# Patient Record
Sex: MALE | Race: WHITE
[De-identification: names, ages, dates, MRNs, and addresses within clinical notes are randomized per-mention and may not be internally consistent; named-entity substitution may affect disease eponyms.]

---

## 2017-06-01 ENCOUNTER — HOSPITAL ENCOUNTER (OUTPATIENT)
Dept: HOSPITAL 53 - M SFHCLACO | Age: 47
End: 2017-06-01
Attending: PHYSICIAN ASSISTANT
Payer: COMMERCIAL

## 2017-06-01 DIAGNOSIS — E78.2: ICD-10-CM

## 2017-06-01 DIAGNOSIS — I10: Primary | ICD-10-CM

## 2017-06-01 LAB
ALBUMIN SERPL BCG-MCNC: 4.4 GM/DL (ref 3.2–5.2)
ALBUMIN/GLOB SERPL: 1.38 {RATIO} (ref 1–1.93)
ALP SERPL-CCNC: 74 U/L (ref 45–117)
ALT SERPL W P-5'-P-CCNC: 38 U/L (ref 12–78)
ANION GAP SERPL CALC-SCNC: 9 MEQ/L (ref 8–16)
AST SERPL-CCNC: 21 U/L (ref 15–37)
BILIRUB SERPL-MCNC: 0.5 MG/DL (ref 0.2–1)
BUN SERPL-MCNC: 16 MG/DL (ref 7–18)
CALCIUM SERPL-MCNC: 9.4 MG/DL (ref 8.5–10.1)
CHLORIDE SERPL-SCNC: 104 MEQ/L (ref 98–107)
CHOLEST SERPL-MCNC: 320 MG/DL (ref ?–200)
CO2 SERPL-SCNC: 25 MEQ/L (ref 21–32)
CREAT SERPL-MCNC: 0.94 MG/DL (ref 0.7–1.3)
GFR SERPL CREATININE-BSD FRML MDRD: > 60 ML/MIN/{1.73_M2} (ref 60–?)
GLUCOSE SERPL-MCNC: 108 MG/DL (ref 70–105)
POTASSIUM SERPL-SCNC: 4.4 MEQ/L (ref 3.5–5.1)
PROT SERPL-MCNC: 7.6 GM/DL (ref 6.4–8.2)
SODIUM SERPL-SCNC: 138 MEQ/L (ref 136–145)
TRIGL SERPL-MCNC: 302 MG/DL (ref ?–150)

## 2019-06-17 ENCOUNTER — HOSPITAL ENCOUNTER (OUTPATIENT)
Dept: HOSPITAL 53 - M PAIN | Age: 49
End: 2019-06-17
Attending: ANESTHESIOLOGY
Payer: COMMERCIAL

## 2019-06-17 DIAGNOSIS — Z88.0: ICD-10-CM

## 2019-06-17 DIAGNOSIS — M54.5: ICD-10-CM

## 2019-06-17 DIAGNOSIS — G89.29: Primary | ICD-10-CM

## 2019-06-17 DIAGNOSIS — G47.00: ICD-10-CM

## 2019-06-17 DIAGNOSIS — M79.671: ICD-10-CM

## 2019-06-17 DIAGNOSIS — G25.0: ICD-10-CM

## 2019-06-17 DIAGNOSIS — M79.672: ICD-10-CM

## 2019-06-17 DIAGNOSIS — F32.9: ICD-10-CM

## 2019-06-17 DIAGNOSIS — Z88.1: ICD-10-CM

## 2019-06-17 DIAGNOSIS — M25.511: ICD-10-CM

## 2019-06-17 DIAGNOSIS — Z88.8: ICD-10-CM

## 2019-06-17 DIAGNOSIS — M54.6: ICD-10-CM

## 2019-06-17 DIAGNOSIS — M79.18: ICD-10-CM

## 2019-06-17 DIAGNOSIS — Z79.899: ICD-10-CM

## 2019-06-17 DIAGNOSIS — I10: ICD-10-CM

## 2019-06-17 DIAGNOSIS — G43.909: ICD-10-CM

## 2019-06-17 DIAGNOSIS — E78.5: ICD-10-CM

## 2019-06-17 DIAGNOSIS — Z79.891: ICD-10-CM

## 2019-06-30 NOTE — ECWPNPC
PATIENT NAME: AYLEEN HERNANDEZ

: 1970

GENDER: MALE

MRN: [id number]

VISIT DATE: 2019

DISCHARGE DATE: 19 1118

VISIT LOCKED DATE TIME: 

PHYSICIAN: GABBY DURAN MD

RESOURCE: GABBY DURAN MD

 

           

           

REASON FOR APPOINTMENT

           

          1. LBP, NO UPDATED IMAGING

           

HISTORY OF PRESENT ILLNESS

           

      PAIN SCREENING:

      PATIENT HAS A COMPLAINT OF ACUTE OR CHRONIC PAIN

           

           

          :YES

           

           49 YEAR OLD MALE PATIENT WITH A HISTORY OF CHRONIC LOW BACK AND

          THORACIC PAIN. THE PATIENT DESCRIBES THE PAIN AS ACHING,

          SHOOTING, SHARP, SORE, TENDER, AND DAILY WITH A PAIN SCORE OF

          3-7/10 DEPENDING ON PHYSICAL ACTIVITY. THE PATIENT STATES THE

          PAIN STARTS IN HIS THORACIC, LOW BACK, AND BOTH LEGS, BUT HIS

          LEFT LEG IS AFFECTED MORE THAN HIS RIGHT. THE PATIENT SAYS HE

          EXPERIENCES TINGLING IN HIS FEET AND HEELS WHILE RESTING AND

          SHARP, STABBING PAIN WHILE WALKING. THE PATIENT SAYS THE BACK AND

          LEG PAIN BEGINS IN THE MORNING AND LASTS THROUGH THE AFTERNOON

          WHILE HE IS AT WORK AND THE PAIN INCREASES WITH PHYSICAL

          ACTIVITIES. THE PATIENT STATES HE HAS BEEN SUFFERING FROM THIS

          PAIN FOR ABOUT 2 YEARS. THE PATIENT SAYS THE PAIN IS AFFECTING

          HIS ABILITY TO PERFORM HIS DAILY ACTIVITIES AT WORK AND AT HOME.

          THE PATIENT IS CURRENTLY USING NORCO 5-325 MG AND CYCLOBENZAPRINE

          10 MG AS PAIN RELIEVERS. PATIENT DENIES UNEXPLAINABLE WEIGHT

          LOSS, FEVER, CHILLS, NEW CHANGES ON HIS URINARY OR BOWEL CONTROL.

           

      FALL RISK SCREENING:

      SCREENING

           

           

          :NO FALLS REPORTED IN THE LAST YEAR

           

CURRENT MEDICATIONS

           

          TAKING LISINOPRIL 40 MG TABLET 1 TABLET ORALLY ONCE A DAY

          TAKING ZOMIG 5 MG TABLET 1 TABLET AS NEEDED ONE TIME ORALLY ONCE

          A DAY

          TAKING LIDOCAINE 5 % OINTMENT APPLY TO LOW BACK EXTERNALLY THREE

          TIMES A DAY

          TAKING CYCLOBENZAPRINE HCL 10 MG TABLET 1 TABLET ORALLY THREE

          TIMES A DAY AS NEEDED FOR BACK PAIN

          TAKING NORCO 5-325 MG TABLET 1 TABLET ORALLY THREE TIMES A DAY AS

          NEEDED, MDD=3

          TAKING SIMVASTATIN 10 MG TABLET 1 TABLET IN THE EVENING ORALLY

          ONCE A DAY

          TAKING ZOLPIDEM TARTRATE 10 MG TABLET 1 TABLET ORALLY ONCE A DAY

          AT BEDTIME

          NOT-TAKING PROZAC 20 MG CAPSULE 1 CAPSULE ORALLY ONCE A DAY

          MEDICATION LIST REVIEWED AND RECONCILED WITH THE PATIENT

           

PAST MEDICAL HISTORY

           

          RECURRENT LOW BACK PAIN (MRI NEGATIVE FOR DISC DISEASE)

          DEPRESSION

          INSOMNIA

          MIGRAINES

          RIGHT SHOULDER PAIN

          HYPERLIPIDEMIA

          ESSENTIAL TREMORS

          PAIN RIGHT ELBOW

          ESSENTIAL HYPERTENSION

           

ALLERGIES

           

          PENICILLIN V-POTASSIUM: HIVES - ALLERGY

          LEVAQUIN: MUSCLE PAIN - SIDE EFFECTS

          PROZAC: MUSCLE AND JOINT PAIN - SIDE EFFECTS

           

SURGICAL HISTORY

           

          LAPAROSCOPIC APPENDECTOMY 

          CHOLECYSTECTOMY 

          BILATERAL CARPAL REPAIR WITH ULNAR NERVE REPOSITIONING 

          RIGHT SHOULDER DECOMPRESSION 2016

           

FAMILY HISTORY

           

          FATHER:  34 YRS, LIVER CANCER

          MOTHER: ALIVE, HYPERTENSION

          2DAUGHTER(S) - HEALTHY.

           

SOCIAL HISTORY

           

          GENERAL:

           

          TOBACCO USE

          ARE YOU A:NONSMOKER , NEVER SMOKER

          ADDITIONAL FINDINGS: TOBACCO USERCHEWS LOOSE LEAF TOBACCO

          COUNCELED ON THE IMPORTANCE OF NOT CHEWING TOBACCO

           

           

          HIV / HEP-C SCREENING

          HIV TEST OFFERED TO PATIENT:YES

          DATE OFFERED:2017

          TEST ACCEPTED:NO

          HEP-C TEST OFFERED TO PATIENT:YES

          DATE OFFERED:2017

          REASON:PATIENT DECLINED

          TEST ACCEPTED:NO

          REASON:PATIENT DECLINED

           

           

          OTHERS AT HOME: WIFE, CHILDREN.

           

           

          EDUCATION

          LEVEL OF EDUCATION:FINISHED [employer]

           

           

          DIET: LOW FAT, LOW CHOLESTEROL.

           

           

          LANGUAGE

          LANGUAGES SPOKEN:ENGLISH

           

           

          DOMESTIC VIOLENCE

          DO YOU FEEL SAFE IN YOUR ENVIRONMENT?YES

           

           

          BMI CARE GOAL FOLLOW-UP

          ABOVE NORMAL BMI FOLLOW-UPDIETARY MANAGEMENT EDUCATION, GUIDANCE,

          AND COUNSELING

           

           

          RECREATIONAL DRUG USE  DENIES.

           

           

          EXERCISE: NO REGULAR EXERCISE.

           

           

          LEARNING BARRIERS / SPECIAL NEEDS

          CHANGE FROM LAST VISIT?NO

          BARRIERS TO LEARNING?NO

          HEARING IMPAIRED?NO

          VISION IMPAIRED?YES

          :CORRECTIVE LENSES

          COGNITIVELY IMPAIRED?NO

          READINESS TO LEARN?YES

          LEARNING PREFERENCES?NO

          LEARNING CAPABILITIES PRESENT?YES

          EMOTIONAL BARRIERS?NO

          SPECIAL DEVICES?NO

           NEEDED?NO

           

           

          LUNG CANCER SCREENING

          SMOKING STATUS:NON SMOKER

           

           

          PAIN CLINIC PFS, CLERGY, PUBLIC HEALTH REFERRALS

          HAS THE PATIENT BEEN EDUCATED REGARDING HIS/HER PLAN OF CARE?YES

          HAS THE PATIENT BEEN EDUCATED REGARDING PAIN, THE RISK FOR PAIN,

          THE IMPORTANCE OF EFFECTIVE PAIN MANAGEMENT, AND THE PAIN

          ASSESSMENT PROCESS?YES

           

           

          LATEX QUESTIONNAIRE

          LATEX ALLERGY : HAVE YOU EVER DEVELOPED ANY TYPE OF REACTION

          AFTER HANDLING LATEX PRODUCTS SUCH AS RUBBER GLOVES, CONDOMS,

          DIAPHRAGMS, BALLOONS, SOCKS, OR UNDERWEAR?NO

          LATEX ALLERGY : HAVE YOU EVER DEVELOPED ANY TYPE OF REACTION

          DURING OR AFTER DENTAL APPOINTMENT, VAGINAL/RECTAL EXAMINATION,

          SURGICAL PROCEDURE, OR ANY OTHER EXPOSURE?NO

          LATEX RISK : HAVE YOU EVER HAD ANY DIFFICULTY BREATHING OR HIVES

          AFTER EATING OR HANDLING ANY FRUITS, OR VEGETABLES; SUCH AS KIWI,

          BANANAS, STONE FRUITS, OR CHESTNUTSNO

          LATEX RISK : DO YOU HAVE A PREVIOUS PERSONAL HISTORY OF MORE THAN

          NINE SURGERIES, SPINA BIFIDA, OR REPEATED CATHERTIZATIONS? NO

          LATEX RISK : ARE YOU FREQUENTLY EXPOSED TO LATEX PRODUCTS IN YOUR

          OCCUPATION?NO

          DATE ASKED : 2019

           

           

          CAFFEINE

          CAFFEINE USE?YES

          HOW OFTEN AND HOW MUCH? 2-3 COFFEE/DAY

           

           

          ADVANCE DIRECTIVE

          ADVANCE DIRECTIVE DISCUSSED WITH PATIENT:YES 19 PT DOES NOT

          HAVE ANY ADVANCE DIRECTIVES AND HE DECLINES INFORMATION ON HCP AT

          THIS TIME

           

           

          [employer]

          [id number]

           

           

          MARITAL STATUS: , 2 CHILDREN.

           

           

          ALCOHOL SCREENING

          DID YOU HAVE A DRINK CONTAINING ALCOHOL IN THE PAST YEAR?YES

          HOW OFTEN DID YOU HAVE SIX OR MORE DRINKS ON ONE OCCASION IN THE

          PAST YEAR?NEVER (0 POINTS)

          HOW MANY DRINKS DID YOU HAVE ON A TYPICAL DAY WHEN YOU WERE

          DRINKING IN THE PAST YEAR?1 OR 2 (0 POINTS)

          HOW OFTEN DID YOU HAVE A DRINK CONTAINING ALCOHOL IN THE PAST

          YEAR?TWO TO THREE TIMES PER WEEK (3 POINTS)

          POINTS3

          INTERPRETATIONNEGATIVE

           

           

          OCCUPATION: .

           

           

          SEXUAL HX

          HAD SEX IN THE LAST 12 MONTHS (VAGINAL, ORAL, OR ANAL)?YES

          WITHWOMEN ONLY

           

           

          ,.

           

HOSPITALIZATION/MAJOR DIAGNOSTIC PROCEDURE

           

          SURGERIES AS ABOVE

           

REVIEW OF SYSTEMS

           

      REVIEWED BY:

           

          PROVIDER:    GABBY DURAN MD .

           

      CONSTITUTIONAL:

           

          ANY CHANGE IN YOUR MEDICAL CONDITION?    NO . CHILLS    NO .

          FEVER    NO .

           

      INFECTION:

           

          DO YOU HAVE NEW INFECTIONS?    NO . DO YOU HAVE HISTORY OF MRSA? 

            NO .

           

      MUSCULOSKELETAL:

           

          ANY NEW PATTERNS OF PAIN OR NUMBNESS?    YES, WITHIN THE LAST FEW

          WEEKS HAS BEEN HAVING NECK AND SHOULDER PAIN AND HE HAS BEEN

          WAKING UP WITH LEFT HAND BEING NUMB . SYTEMIC LUPUS    NO .

           

      GASTROENTEROLOGY:

           

          ANY NEW CHANGE IN BOWEL CONTROL?    NO . BARRETTS ESOPHAGUS    NO

          . CIRRHOSIS    NO . HEPATITIS    NO . LIVER FAILURE    NO . ACID

          REFLUX    NO . UNEXPLAINED WEIGHT LOSS    NO .

           

      GENITOURINARY:

           

          ANY NEW CHANGE IN BLADDER CONTROL?    NO . IS THERE A CHANCE YOU

          COULD BE PREGNANT?    NO .

           

      HEMATOLOGY/LYMPH:

           

          DO YOU TAKE ANY BLOOD THINNERS? (FOR EXAMPLE- COUMADIN, PLAVIX,

          AGGRENOX, PLATEL, PRADAXA, OR XARELTO)    NO . WHEN WAS YOUR LAST

          DOSE?    DATE: TIME:  . LOW PLATELET COUNT    NO . SICKLE CELL

          DISEASE    NO . VON WILLIEBRANDS    NO . FACTOR V LEIDEN    NO .

          THALLASEMIA    NO . ANEMIA    NO . EASY BRUISING    NO .

           

      NEUROLOGY:

           

          HAVE YOU FALLEN IN THE PAST 12 MONTHS?    NO . ANY NEW EXTREMITY

          NUMBNESS OR WEAKNESS?    YES, LEFT ARM NUMB AT TIMES WHEN HE

          WAKES UP . HEAD INJURY    NO . DEMENTIA    NO . CEREBRAL PALSY   

          NO . MULTIPLE SCLEROSIS    NO . DIZZINESS    NO . HEADACHE   

          ADMITS TO MIGRAINES APPROX. ONCE A MONTH . STROKES    NO .

          VERTIGO    NO .

           

      CARDIOLOGY:

           

          DO YOU HAVE A PACEMAKER OR DEFIBRILLATOR?    NO . ANGINA    NO .

          HEART ATTACK    NO . HEART SURGERY    NO . CONGESTIVE HEART

          FAILURE/FLUID OVERLOAD    NO . CHEST PAIN    NO . HIGH BLOOD

          PRESSURE    ON MEDICATION(S) . IRREGULAR HEART BEAT    NO .

           

      RESPIRATORY:

           

          HAVE YOU BEEN SICK IN THE PAST WEEK?    NO . FEVER    NO . FLU

          LIKE SYMPTOMS?    NO . CPAP    HAS MILD WILLI AND HAS A CPAP BUT

          DOESN'T USE . BYPAP    NO . ASTHMA    NO . EMPHYSEMA    NO .

          CHRONIC LUNG DISEASES    NO . SHORTNESS OF BREATH ON EXERTION   

          NO . COUGH    NO . SNORING    NO .

           

      INTEGUMENTARY:

           

          DO YOU HAVE ANY RASHES OR OPEN SORES?    NO .

           

      ALLERGIC/IMMUNO:

           

          ARE YOU ALLERGIC TO IV DYE?    NO . ANY NEW ALLERGIES?    NO .

           

      PSYCHIATRIC:

           

          DO YOU HAVE THOUGHTS OF HURTING YOURSELF OR SOMEONE ELSE?    NO .

          ARE YOU ABUSED, NEGLECTED, OR IN AN UNSAFE ENVIRONMENT?    NO .

           

      ENDOCRINOLOGY:

           

          ARE YOU DIABETIC?    NO . THYROID DISORDER    NO .

           

      OTHER:

           

          DO YOU NEED ANY PRESCRIPTIONS?    YES, NO . IF YES, PLEASE LIST: 

            NORCO, FLEXERIL . ANY NEW PROBLEMS WITH YOUR MEDICATIONS?    NO

          . WHEN DID YOU LAST EAT?    ____ . WHEN DID YOU LAST DRINK?   

          ____ . WHAT DID YOU LAST DRINK?    ____ . NAME OF PERSON DRIVING

          YOU HOME?    ____ . DO YOU HAVE ANY OTHER QUESTIONS OR CONCERNS  

           NO .

           

VITAL SIGNS

           

          .4 LBS, HT 69.5 IN, BMI 30.04 INDEX, /78 MM HG, HR 98

          /MIN, RR 18 /MIN, TEMP 99.1 F, OXYGEN SAT % 98%, SAFE IN ENV?

          (Y/N) Y, NA INITIALS AW 0919, REVIEWED BY: LINDA.

           

EXAMINATION

           

      GENERAL EXAMINATION: PATIENT IS ALERT O X 3 AND

          COOPERATIVE. LUNGS CLEAR, TO AUSCULTATION. HEART: NO MURMURS OR

          GALLOPS; FACIAL CRANIAL NERVES ARE GROSSLY NORMAL. GOOD SYMMETRY

          OF FACIAL MUSCLE MOVEMENT. NORMAL VISUAL FIELDS. TENDERNESS IN

          THE THORACIC AND LUMBAR SPINE NEAR THE FACET JOINTS. PRESENCE OF

          BANDS OF TISSUE AND TRIGGER POINTS WITH RESTRICTION OF MOVEMENT

          OF THE THORACIC AND LUMBAR SPINE. TENDERNESS IN THE LOW BACK OVER

          THE SACROILIAC JOINT. FABERE TEST IS POSITIVE FOR BILATERAL

          SACROILIAC JOINT DYSFUNCTION. STRAIGHT LEG RAISE OF BOTH LEGS DO

          NOT INDUCE PAIN. LEFT LEG IS WEAKER AT EXTENSION AND FLEXION. MRI

          OF THE LUMBAR SPINE DONE ON 10/05/2012 SHOWS BULGING DISC AT

          L5-S1 LEVEL.

           

ASSESSMENTS

           

          MYALGIA, OTHER SITE - M79.18 (PRIMARY)

           

          LOW BACK PAIN - M54.5

           

          OTHER CHRONIC PAIN - G89.29

           

          PAIN IN THORACIC SPINE - M54.6

           

          PAIN IN RIGHT FOOT - M79.671

           

          PAIN OF LEFT FOOT - M79.672

           

          RULE OUT SACROILIAC JOINT DYSFUNCTION.

           

TREATMENT

           

      MYALGIA, OTHER SITE

          CLINICAL NOTES: WE DISCUSSED SEVERAL ISSUES WITH MR. HERNANDEZ'S

          PAIN MANAGEMENT CASE. I DISCUSSED WITH THE PATIENT ABOUT THE

          OPTIONS OF TRIGGER POINT INJECTIONS OR A SACROILIAC JOINT BLOCK

          ON THE LEFT SIDE. DUE TO THE TRIGGER POINTS, BANDS OF TISSUE, AND

          RESTRICTION OF MOVEMENT, I WOULD LIKE TO MOVE FORWARD WITH

          THORACIC AND LOW BACK TRIGGER POINT INJECTIONS AT THIS TIME. WE

          DISCUSSED THE BENEFITS, RISKS, AND ALTERNATIVES OF THE INJECTION

          AND THE PATIENT WOULD LIKE TO PROCEED. I WILL REFER THE PATIENT

          TO A PODIATRIST DUE TO HEEL PAIN. THE PATIENT WILL FOLLOW UP IN

          SEVERAL WEEKS AFTER THE INJECTION. INSTRUCTIONS WERE GIVEN,

          QUESTIONS WERE ANSWERED, PATIENT REPORTS UNDERSTANDING AND AGREES

          WITH THE PLAN. I, SABINO PEREZ, DOCUMENTED THE ABOVE

          INFORMATION ACTING AS A SCRIBE FOR DR. DURAN. I HAVE REVIEWED

          THE ABOVE DOCUMENT, WRITTEN BY SABINO NOLAN AND I VERIFY

          THAT IT IS ACCURATE.

           

          DEAR JOYCE TAYLOR PA-C:

           

          THANK YOU FOR YOUR KIND REFERRAL OF AYLEEN HERNANDEZ. IF YOU WANT TO

          DISCUSS HIS CASE WITH ME PLEASE CALL ME AT THE PAIN CENTER AT

          [phone].

           

          SINCERELY,

           

          GABBY DURAN MD

           

          PAIN MEDICINE

           

          .

           

PREVENTIVE MEDICINE

           

      PAIN CLINIC TEACHING:

           

          PROCEDURE TEACHING   PT GIVEN WRITTEN AND VERBAL EDUCATION ON

          TRIGGER POINT INJECTIONS. PT ALSO GIVEN WRITTEN AND VERBAL

          PRE-PROCEDURE INSTRUCTIONS. PT VERBALIZES UNDERSTANDING OF ALL

          EDUCATION AND INSTRUCTIONS. SAPNA VALENTINO 2019 11:18:20

          AM > .

           

PROCEDURE CODES

           

           ESTABILISHED PATIENT [facility] FACILITY CHARGE

           

           CURRENT MEDS W/DOSAGES DOCUMENTED

           

           PAIN ASSESS POS TOOL F/U PLAN DOC

           

DISPOSITION & COMMUNICATION

           

FOLLOW UP

           

          3 WEEKS (REASON: TPI)

           

 

ELECTRONICALLY SIGNED BY GABBY DURAN MD, MD ON

          2019 AT 07:04 PM EDT

           

           

           

 

DISCLAIMER :

THIS IS A VISIT SUMMARY EXTRACTED FROM THE [employer] CHART.

IT IS NOT A COPY OF THE [employer] PROGRESS NOTE.

VIOLETTE

## 2019-08-05 ENCOUNTER — HOSPITAL ENCOUNTER (OUTPATIENT)
Dept: HOSPITAL 53 - M PAIN | Age: 49
End: 2019-08-05
Attending: ANESTHESIOLOGY
Payer: COMMERCIAL

## 2019-08-05 DIAGNOSIS — F32.9: ICD-10-CM

## 2019-08-05 DIAGNOSIS — M25.521: ICD-10-CM

## 2019-08-05 DIAGNOSIS — I10: ICD-10-CM

## 2019-08-05 DIAGNOSIS — G43.909: ICD-10-CM

## 2019-08-05 DIAGNOSIS — Z79.899: ICD-10-CM

## 2019-08-05 DIAGNOSIS — Z90.49: ICD-10-CM

## 2019-08-05 DIAGNOSIS — M54.5: ICD-10-CM

## 2019-08-05 DIAGNOSIS — M25.511: ICD-10-CM

## 2019-08-05 DIAGNOSIS — G47.00: ICD-10-CM

## 2019-08-05 DIAGNOSIS — M79.18: Primary | ICD-10-CM

## 2019-08-05 DIAGNOSIS — E78.5: ICD-10-CM

## 2019-08-05 DIAGNOSIS — Z79.891: ICD-10-CM

## 2019-08-05 DIAGNOSIS — G25.0: ICD-10-CM

## 2019-08-05 PROCEDURE — 20552 NJX 1/MLT TRIGGER POINT 1/2: CPT

## 2019-08-13 NOTE — ECWPNPC
PATIENT NAME: AYLEEN HERNANDEZ

: 1970

GENDER: MALE

MRN: [id number]

VISIT DATE: 2019

DISCHARGE DATE: 19

VISIT LOCKED DATE TIME: 

PHYSICIAN: GABBY DURAN MD

RESOURCE: GABBY DURAN MD

 

           

           

REASON FOR APPOINTMENT

           

          1. TPI

           

HISTORY OF PRESENT ILLNESS

           

      HISTORY OF PRESENT ILLNESS:

      PAIN

           

           

          THE PATIENT DESCRIBES THE PAIN...

           

      FALL RISK SCREENING:

      SCREENING

           

           

          :NO FALLS REPORTED IN THE LAST YEAR

           

CURRENT MEDICATIONS

           

          TAKING ZOMIG 5 MG TABLET 1 TABLET AS NEEDED ONE TIME ORALLY ONCE

          A DAY, NOTES: 1 WEEK AGO

          TAKING LIDOCAINE 5 % OINTMENT APPLY TO LOW BACK EXTERNALLY THREE

          TIMES A DAY, NOTES: 2 DAYS AGO

          TAKING CYCLOBENZAPRINE HCL 10 MG TABLET 1 TABLET ORALLY THREE

          TIMES A DAY AS NEEDED FOR BACK PAIN, NOTES: 19@0800

          TAKING SIMVASTATIN 10 MG TABLET 1 TABLET IN THE EVENING ORALLY

          ONCE A DAY, NOTES: 19@

          TAKING ZOLPIDEM TARTRATE 10 MG TABLET 1 TABLET ORALLY ONCE A DAY

          AT BEDTIME, NOTES: 

          TAKING NORCO 5-325 MG TABLET 1 TABLET ORALLY THREE TIMES A DAY AS

          NEEDED, MDD=3, NOTES: 

          TAKING LISINOPRIL 40 MG TABLET 1 TABLET ORALLY ONCE A DAY, NOTES:

          

          DISCONTINUED PROZAC 20 MG CAPSULE 1 CAPSULE ORALLY ONCE A DAY

           

PAST MEDICAL HISTORY

           

          RECURRENT LOW BACK PAIN (MRI NEGATIVE FOR DISC DISEASE)

          DEPRESSION

          INSOMNIA

          MIGRAINES

          RIGHT SHOULDER PAIN

          HYPERLIPIDEMIA

          ESSENTIAL TREMORS

          PAIN RIGHT ELBOW

          ESSENTIAL HYPERTENSION

           

ALLERGIES

           

          PENICILLIN V-POTASSIUM: HIVES - ALLERGY

          LEVAQUIN: MUSCLE PAIN - SIDE EFFECTS

          PROZAC: MUSCLE AND JOINT PAIN - SIDE EFFECTS - ONSET DATE

          2019

           

SURGICAL HISTORY

           

          LAPAROSCOPIC APPENDECTOMY 

          CHOLECYSTECTOMY 

          BILATERAL CARPAL REPAIR WITH ULNAR NERVE REPOSITIONING 

          RIGHT SHOULDER DECOMPRESSION 2016

           

FAMILY HISTORY

           

          FATHER:  34 YRS, LIVER CANCER

          MOTHER: ALIVE, HYPERTENSION

          2DAUGHTER(S) - HEALTHY.

           

SOCIAL HISTORY

           

          GENERAL:

           

          TOBACCO USE

          ARE YOU A:NONSMOKER , NEVER SMOKER

          ADDITIONAL FINDINGS: TOBACCO USERCHEWS LOOSE LEAF TOBACCO

          COUNCELED ON THE IMPORTANCE OF NOT CHEWING TOBACCO

           

           

          HIV / HEP-C SCREENING

          HIV TEST OFFERED TO PATIENT:YES

          DATE OFFERED:2017

          TEST ACCEPTED:NO

          HEP-C TEST OFFERED TO PATIENT:YES

          DATE OFFERED:2017

          REASON:PATIENT DECLINED

          TEST ACCEPTED:NO

          REASON:PATIENT DECLINED

           

           

          OTHERS AT HOME: WIFE, CHILDREN.

           

           

          EDUCATION

          LEVEL OF EDUCATION:FINISHED [employer]

           

           

          DIET: LOW FAT, LOW CHOLESTEROL.

           

           

          LANGUAGE

          LANGUAGES SPOKEN:ENGLISH

           

           

          DOMESTIC VIOLENCE

          DO YOU FEEL SAFE IN YOUR ENVIRONMENT?YES

           

           

          BMI CARE GOAL FOLLOW-UP

          ABOVE NORMAL BMI FOLLOW-UPDIETARY MANAGEMENT EDUCATION, GUIDANCE,

          AND COUNSELING

           

           

          RECREATIONAL DRUG USE  DENIES.

           

           

          EXERCISE: NO REGULAR EXERCISE.

           

           

          LEARNING BARRIERS / SPECIAL NEEDS

          CHANGE FROM LAST VISIT?NO

          BARRIERS TO LEARNING?NO

          HEARING IMPAIRED?NO

          VISION IMPAIRED?YES

          COGNITIVELY IMPAIRED?NO

          :CORRECTIVE LENSES

          READINESS TO LEARN?YES

          LEARNING PREFERENCES?NO

          LEARNING CAPABILITIES PRESENT?YES

          EMOTIONAL BARRIERS?NO

          SPECIAL DEVICES?NO

           NEEDED?NO

           

           

          LUNG CANCER SCREENING

          SMOKING STATUS:NON SMOKER

           

           

          PAIN CLINIC PFS, CLERGY, PUBLIC HEALTH REFERRALS

          HAS THE PATIENT BEEN EDUCATED REGARDING HIS/HER PLAN OF CARE?YES

          HAS THE PATIENT BEEN EDUCATED REGARDING PAIN, THE RISK FOR PAIN,

          THE IMPORTANCE OF EFFECTIVE PAIN MANAGEMENT, AND THE PAIN

          ASSESSMENT PROCESS?YES

           

           

          LATEX QUESTIONNAIRE

          LATEX ALLERGY : HAVE YOU EVER DEVELOPED ANY TYPE OF REACTION

          AFTER HANDLING LATEX PRODUCTS SUCH AS RUBBER GLOVES, CONDOMS,

          DIAPHRAGMS, BALLOONS, SOCKS, OR UNDERWEAR?NO

          LATEX ALLERGY : HAVE YOU EVER DEVELOPED ANY TYPE OF REACTION

          DURING OR AFTER DENTAL APPOINTMENT, VAGINAL/RECTAL EXAMINATION,

          SURGICAL PROCEDURE, OR ANY OTHER EXPOSURE?NO

          LATEX RISK : HAVE YOU EVER HAD ANY DIFFICULTY BREATHING OR HIVES

          AFTER EATING OR HANDLING ANY FRUITS, OR VEGETABLES; SUCH AS KIWI,

          BANANAS, STONE FRUITS, OR CHESTNUTSNO

          LATEX RISK : DO YOU HAVE A PREVIOUS PERSONAL HISTORY OF MORE THAN

          NINE SURGERIES, SPINA BIFIDA, OR REPEATED CATHERIZATIONS? NO

          LATEX RISK : ARE YOU FREQUENTLY EXPOSED TO LATEX PRODUCTS IN YOUR

          OCCUPATION?NO

          DATE ASKED : 2019

           

           

          CAFFEINE

          CAFFEINE USE?YES

          HOW OFTEN AND HOW MUCH? 2-3 COFFEE/DAY

           

           

          ADVANCE DIRECTIVE

          ADVANCE DIRECTIVE DISCUSSED WITH PATIENT:YES 19 PT DOES NOT

          HAVE ANY ADVANCE DIRECTIVES AND HE DECLINES INFORMATION ON HCP AT

          THIS TIME

           

           

          Scientology

          [id number] [employer]

           

           

          MARITAL STATUS: , 2 CHILDREN.

           

           

          ALCOHOL SCREENING

          DID YOU HAVE A DRINK CONTAINING ALCOHOL IN THE PAST YEAR?YES

          HOW OFTEN DID YOU HAVE SIX OR MORE DRINKS ON ONE OCCASION IN THE

          PAST YEAR?NEVER (0 POINTS)

          HOW MANY DRINKS DID YOU HAVE ON A TYPICAL DAY WHEN YOU WERE

          DRINKING IN THE PAST YEAR?1 OR 2 (0 POINTS)

          HOW OFTEN DID YOU HAVE A DRINK CONTAINING ALCOHOL IN THE PAST

          YEAR?TWO TO THREE TIMES PER WEEK (3 POINTS)

          POINTS3

          INTERPRETATIONNEGATIVE

           

           

          OCCUPATION: .

           

           

          SEXUAL HX

          HAD SEX IN THE LAST 12 MONTHS (VAGINAL, ORAL, OR ANAL)?YES

          WITHWOMEN ONLY

           

           

          ,.

           

HOSPITALIZATION/MAJOR DIAGNOSTIC PROCEDURE

           

          SURGERIES AS ABOVE

           

REVIEW OF SYSTEMS

           

      REVIEWED BY:

           

          PROVIDER:    _____ .

           

      CONSTITUTIONAL:

           

          ANY CHANGE IN YOUR MEDICAL CONDITION?    NO . CHILLS    NO .

          FEVER    NO .

           

      INFECTION:

           

          DO YOU HAVE NEW INFECTIONS?    NO . DO YOU HAVE HISTORY OF MRSA? 

            NO .

           

      MUSCULOSKELETAL:

           

          ANY NEW PATTERNS OF PAIN OR NUMBNESS?    NO .

           

      GASTROENTEROLOGY:

           

          ANY NEW CHANGE IN BOWEL CONTROL?    NO .

           

      GENITOURINARY:

           

          ANY NEW CHANGE IN BLADDER CONTROL?    NO . IS THERE A CHANCE YOU

          COULD BE PREGNANT?    NO .

           

      HEMATOLOGY/LYMPH:

           

          DO YOU TAKE ANY BLOOD THINNERS? (FOR EXAMPLE- COUMADIN, PLAVIX,

          AGGRENOX, PLATEL, PRADAXA, OR XARELTO)    NO . WHEN WAS YOUR LAST

          DOSE?    DATE: TIME:  .

           

      NEUROLOGY:

           

          HAVE YOU FALLEN IN THE PAST 12 MONTHS?    NO . ANY NEW EXTREMITY

          NUMBNESS OR WEAKNESS?    NO .

           

      CARDIOLOGY:

           

          DO YOU HAVE A PACEMAKER OR DEFIBRILLATOR?    NO .

           

      RESPIRATORY:

           

          HAVE YOU BEEN SICK IN THE PAST WEEK?    NO . FEVER    NO . FLU

          LIKE SYMPTOMS?    NO . COUGH    NO .

           

      INTEGUMENTARY:

           

          DO YOU HAVE ANY RASHES OR OPEN SORES?    NO .

           

      ALLERGIC/IMMUNO:

           

          ARE YOU ALLERGIC TO IV DYE?    NO . ANY NEW ALLERGIES?    NO .

           

      PSYCHIATRIC:

           

          DO YOU HAVE THOUGHTS OF HURTING YOURSELF OR SOMEONE ELSE?    NO .

          ARE YOU ABUSED, NEGLECTED, OR IN AN UNSAFE ENVIRONMENT?    NO .

           

      ENDOCRINOLOGY:

           

          ARE YOU DIABETIC?    NO .

           

      OTHER:

           

          DO YOU NEED ANY PRESCRIPTIONS?    NO . IF YES, PLEASE LIST:   

          ____ . ANY NEW PROBLEMS WITH YOUR MEDICATIONS?    NO . WHEN DID

          YOU LAST EAT?    ____19 . WHEN DID YOU LAST DRINK?   

          ____29 . WHAT DID YOU LAST DRINK?    ____WATER . NAME OF

          PERSON DRIVING YOU HOME?    ____EVANGELIST HERNANDEZ . DO YOU HAVE ANY

          OTHER QUESTIONS OR CONCERNS    NO .

           

VITAL SIGNS

           

          .4 LBS, HT 69.5 IN, BMI 30.04 INDEX, /86 MM HG, HR 86

          /MIN, RR 18 /MIN, TEMP 98.5 F, OXYGEN SAT % 96%, SAFE IN ENV?

          (Y/N) YES, NA INITIALS AW 0857, REVIEWED BY: VD.

           

ASSESSMENTS

           

          MYALGIA, OTHER SITE - M79.18 (PRIMARY)

           

PROCEDURES

           

      PN TRIGGER POINT INJECTION WITH STEROIDS

          PRE PROCEDURE DIAGNOSIS    1. MYALGIA 2. PAIN AT BILATERAL LOW

          BACK AREA.

          POST PROCEDURE DIAGNOSIS    1. MYALGIA 2. PAIN AT BILATERAL LOW

          BACK AREA.

          PROCEDURE    TRIGGER POINT INJECTION AT RIGHT AND LEFT LOW BACK

          AREA.

          SURGEON    DR. GABBY DURAN

          ASSISTANT    NONE

          ANESTHESIA    LOCAL

          PRE PROCEDURE NOTE    THE PATIENT HAS A HISTORY OF CHRONIC PAIN

          AT THE RIGHT AND LEFT LOW BACK AREA. I EVALUATED THE PATIENT AND

          REVIEWED THE CHART. THERE IS EVIDENCE OF BANDS OF TISSUE WITH

          RESTRICTION OF MOVEMENT AND PRESENCE OF TRIGGER POINT AT THE

          AFFECTED AREA. I WENT OVER THE RISKS, ALTERNATIVES, AND BENEFITS

          ASSOCIATED WITH THIS PROCEDURE. THE PATIENT WOULD LIKE TO PROCEED

          AND GIVE CONSENT TO PERFORMED THE PROCEDURE. THE PATIENT DENIES

          UNEXPLAINABLE WEIGHT LOSS, FEVER, CHILLS, OR NEW CHANGES IN

          URINARY OR BOWEL CONTROL

          DESCRIPTION OF PROCEDURE    THE PATIENT WAS BROUGHT TO THE

          PROCEDURE ROOM AND PLACED IN THE SITTING POSITION. THE AREA WAS

          CLEANED WITH ALCOHOL. THE PROCEDURE WAS DONE USING ASEPTIC

          STERILE TECHNIQUE. I CHECKED LATERALITY AND THE LEVEL WHERE THE

          PROCEDURE WAS GOING TO BE PERFORMED WITH THE PATIENT AND THE

          SUPPORTING STAFF AT THE MOMENT OF THE TIME OUT IN THE PROCEDURE

          ROOM. USING A 25-GAUGE NEEDLE, TRIGGER POINTS WERE INJECTED AT

          THE RIGHT AND LEFT LOW BACK AREA WITH A TOTAL OF 40 ML OF

          BUPIVACAINE 0.25% AND KENALOG 40 MG. THERE WAS NO EVIDENCE OF

          BLOOD, PARESTHESIA OR CEREBROSPINAL FLUID DURING THE PROCEDURE.

          THE PATIENT WAS SENT TO THE RECOVERY ROOM. THE PATIENT WAS MOVING

          THE EXTREMITIES AND DOING WELL. THERE WAS NO COMPLICATION DURING

          THE PROCEDURE

          POST PROCEDURE NOTE    THE PATIENT WILL BE SEEN IN A FOLLOW UP IN

          THE NEXT FEW WEEKS. INSTRUCTIONS WERE GIVEN, QUESTIONS WERE

          ANSWERED, AND THE PATIENT EXPRESSED UNDERSTANDING AND AGREES WITH

          THE PLAN. I, SABINO PEREZ, DOCUMENTED THE ABOVE INFORMATION

          ACTING AS A SCRIBE FOR DR. DURAN. I HAVE REVIEWED THE ABOVE

          DOCUMENT, WRITTEN BY SABINO NOLAN AND I VERIFY THAT IT

          IS ACCURATE.

           

PROCEDURE CODES

           

          [id number] INJ TRIGGER POINT  [facility] [place]

           

DISPOSITION & COMMUNICATION

           

FOLLOW UP

           

          3 WEEKS

           

 

ELECTRONICALLY SIGNED BY GABBY DURAN MD, MD ON

          2019 AT 03:16 PM EDT

           

           

           

 

DISCLAIMER :

THIS IS A VISIT SUMMARY EXTRACTED FROM THE [employer] CHART.

IT IS NOT A COPY OF THE [facility] PROGRESS NOTE.

MTDD

## 2019-09-03 ENCOUNTER — HOSPITAL ENCOUNTER (OUTPATIENT)
Dept: HOSPITAL 53 - M PAIN | Age: 49
End: 2019-09-03
Attending: NURSE PRACTITIONER
Payer: COMMERCIAL

## 2019-09-03 DIAGNOSIS — I10: ICD-10-CM

## 2019-09-03 DIAGNOSIS — Z88.0: ICD-10-CM

## 2019-09-03 DIAGNOSIS — M54.5: Primary | ICD-10-CM

## 2019-09-03 DIAGNOSIS — G47.00: ICD-10-CM

## 2019-09-03 DIAGNOSIS — F17.220: ICD-10-CM

## 2019-09-03 DIAGNOSIS — Z79.899: ICD-10-CM

## 2019-09-03 DIAGNOSIS — Z88.8: ICD-10-CM

## 2019-09-03 DIAGNOSIS — G43.909: ICD-10-CM

## 2019-09-03 DIAGNOSIS — Z86.59: ICD-10-CM

## 2019-09-03 DIAGNOSIS — M79.18: ICD-10-CM

## 2019-09-03 DIAGNOSIS — E78.5: ICD-10-CM

## 2019-09-03 DIAGNOSIS — G25.0: ICD-10-CM

## 2019-09-05 NOTE — ECWPNPC
PATIENT NAME: AYLEEN HERNANDEZ

: 1970

GENDER: MALE

MRN: [id number]

VISIT DATE: 2019

DISCHARGE DATE: 19 1458

VISIT LOCKED DATE TIME: 

PHYSICIAN: DEBBY ULLOA 

RESOURCE: DEBBY ULLOA 

 

           

           

REASON FOR APPOINTMENT

           

          1. POST TPI

           

HISTORY OF PRESENT ILLNESS

           

      HISTORY OF PRESENT ILLNESS:

      PAIN

           

           

          THE PATIENT DESCRIBES THE PAIN...

           

           49 YEAR OLD MALE IN FOR POST TPI FOLLOW UP. HE FEELS THE

          PROCEDURE WORKED WELL FOR HIS RIGHT SIDE BUT FURTHER STATES THE

          PAIN HAS RETURNED TO HIS LEFT SIDE. HE WOULD RATE HIS PAIN AT A

          7/10 CURRENTLY AND DESCRIBES IT AS ACHING, AND STABBING. HE DOES

          ADMIT THAT FOR 1 WEEK HIS PAIN RADIATED FROM HIS BACK TO HIS

          TESTICLES FURTHER STATING THAT HE HAS INTERMITTENT LEG WEAKNESS

          AT NIGHT.

           

      FALL RISK SCREENING:

      SCREENING

           

           

          :NO FALLS REPORTED IN THE LAST YEAR

           

CURRENT MEDICATIONS

           

          TAKING LIDOCAINE 5 % OINTMENT APPLY TO LOW BACK EXTERNALLY THREE

          TIMES A DAY

          TAKING CYCLOBENZAPRINE HCL 10 MG TABLET 1 TABLET ORALLY THREE

          TIMES A DAY AS NEEDED FOR BACK PAIN

          TAKING SIMVASTATIN 10 MG TABLET 1 TABLET IN THE EVENING ORALLY

          ONCE A DAY

          TAKING ZOLPIDEM TARTRATE 10 MG TABLET 1 TABLET ORALLY ONCE A DAY

          AT BEDTIME

          TAKING LISINOPRIL 40 MG TABLET 1 TABLET ORALLY ONCE A DAY

          TAKING NORCO 5-325 MG TABLET 1 TABLET ORALLY THREE TIMES A DAY AS

          NEEDED, MDD=3

          TAKING ZOMIG 5 MG TABLET 1 TABLET AS NEEDED ONE TIME ORALLY ONCE

          A DAY

          MEDICATION LIST REVIEWED AND RECONCILED WITH THE PATIENT

           

PAST MEDICAL HISTORY

           

          RECURRENT LOW BACK PAIN (MRI NEGATIVE FOR DISC DISEASE)

          DEPRESSION

          INSOMNIA

          MIGRAINES

          RIGHT SHOULDER PAIN

          HYPERLIPIDEMIA

          ESSENTIAL TREMORS

          PAIN RIGHT ELBOW

          ESSENTIAL HYPERTENSION

           

ALLERGIES

           

          PENICILLIN V-POTASSIUM: HIVES - ALLERGY

          LEVAQUIN: MUSCLE PAIN - SIDE EFFECTS

          PROZAC: MUSCLE AND JOINT PAIN - SIDE EFFECTS - ONSET DATE

          2019

           

SURGICAL HISTORY

           

          LAPAROSCOPIC APPENDECTOMY 

          CHOLECYSTECTOMY 

          BILATERAL CARPAL REPAIR WITH ULNAR NERVE REPOSITIONING 

          RIGHT SHOULDER DECOMPRESSION 2016

           

FAMILY HISTORY

           

          FATHER:  34 YRS, LIVER CANCER

          MOTHER: ALIVE, HYPERTENSION

          2DAUGHTER(S) - HEALTHY.

           

SOCIAL HISTORY

           

          GENERAL:

           

          TOBACCO USE

          ARE YOU A:NONSMOKER , NEVER SMOKER

          ADDITIONAL FINDINGS: TOBACCO USERCHEWS LOOSE LEAF TOBACCO

          COUNCELED ON THE IMPORTANCE OF NOT CHEWING TOBACCO

           

           

          HIV / HEP-C SCREENING

          HIV TEST OFFERED TO PATIENT:YES

          DATE OFFERED:2017

          TEST ACCEPTED:NO

          HEP-C TEST OFFERED TO PATIENT:YES

          DATE OFFERED:2017

          REASON:PATIENT DECLINED

          TEST ACCEPTED:NO

          REASON:PATIENT DECLINED

           

           

          OTHERS AT HOME: WIFE, CHILDREN.

           

           

          EDUCATION

          LEVEL OF EDUCATION:FINISHED [employer]

           

           

          DIET: LOW FAT, LOW CHOLESTEROL.

           

           

          LANGUAGE

          LANGUAGES SPOKEN:ENGLISH

           

           

          DOMESTIC VIOLENCE

          DO YOU FEEL SAFE IN YOUR ENVIRONMENT?YES

           

           

          BMI CARE GOAL FOLLOW-UP

          ABOVE NORMAL BMI FOLLOW-UPDIETARY MANAGEMENT EDUCATION, GUIDANCE,

          AND COUNSELING

           

           

          RECREATIONAL DRUG USE  DENIES.

           

           

          EXERCISE: NO REGULAR EXERCISE.

           

           

          LEARNING BARRIERS / SPECIAL NEEDS

          CHANGE FROM LAST VISIT?NO

          BARRIERS TO LEARNING?NO

          HEARING IMPAIRED?NO

          VISION IMPAIRED?YES

          COGNITIVELY IMPAIRED?NO

          :CORRECTIVE LENSES

          READINESS TO LEARN?YES

          LEARNING PREFERENCES?NO

          LEARNING CAPABILITIES PRESENT?YES

          EMOTIONAL BARRIERS?NO

          SPECIAL DEVICES?NO

           NEEDED?NO

           

           

          LUNG CANCER SCREENING

          SMOKING STATUS:NON SMOKER

           

           

          PAIN CLINIC PFS, CLERGY, PUBLIC HEALTH REFERRALS

          HAS THE PATIENT BEEN EDUCATED REGARDING HIS/HER PLAN OF CARE?YES

          HAS THE PATIENT BEEN EDUCATED REGARDING PAIN, THE RISK FOR PAIN,

          THE IMPORTANCE OF EFFECTIVE PAIN MANAGEMENT, AND THE PAIN

          ASSESSMENT PROCESS?YES

           

           

          LATEX QUESTIONNAIRE

          LATEX ALLERGY : HAVE YOU EVER DEVELOPED ANY TYPE OF REACTION

          AFTER HANDLING LATEX PRODUCTS SUCH AS RUBBER GLOVES, CONDOMS,

          DIAPHRAGMS, BALLOONS, SOCKS, OR UNDERWEAR?NO

          LATEX ALLERGY : HAVE YOU EVER DEVELOPED ANY TYPE OF REACTION

          DURING OR AFTER DENTAL APPOINTMENT, VAGINAL/RECTAL EXAMINATION,

          SURGICAL PROCEDURE, OR ANY OTHER EXPOSURE?NO

          DATE ASKED : 2019

          LATEX RISK : HAVE YOU EVER HAD ANY DIFFICULTY BREATHING OR HIVES

          AFTER EATING OR HANDLING ANY FRUITS, OR VEGETABLES; SUCH AS KIWI,

          BANANAS, STONE FRUITS, OR CHESTNUTSNO

          LATEX RISK : DO YOU HAVE A PREVIOUS PERSONAL HISTORY OF MORE THAN

          NINE SURGERIES, SPINA BIFIDA, OR REPEATED CATHERIZATIONS? NO

          LATEX RISK : ARE YOU FREQUENTLY EXPOSED TO LATEX PRODUCTS IN YOUR

          OCCUPATION?NO

           

           

          CAFFEINE

          CAFFEINE USE?YES

          HOW OFTEN AND HOW MUCH? 2-3 COFFEE/DAY

           

           

          ADVANCE DIRECTIVE

          ADVANCE DIRECTIVE DISCUSSED WITH PATIENT:YES 19 PT DOES NOT

          HAVE ANY ADVANCE DIRECTIVES AND HE DECLINES INFORMATION ON HCP AT

          THIS TIME

           

           

          [employer]

          [id number]

           

           

          MARITAL STATUS: , 2 CHILDREN.

           

           

          ALCOHOL SCREENING

          DID YOU HAVE A DRINK CONTAINING ALCOHOL IN THE PAST YEAR?YES

          HOW OFTEN DID YOU HAVE SIX OR MORE DRINKS ON ONE OCCASION IN THE

          PAST YEAR?NEVER (0 POINTS)

          HOW MANY DRINKS DID YOU HAVE ON A TYPICAL DAY WHEN YOU WERE

          DRINKING IN THE PAST YEAR?1 OR 2 (0 POINTS)

          HOW OFTEN DID YOU HAVE A DRINK CONTAINING ALCOHOL IN THE PAST

          YEAR?TWO TO THREE TIMES PER WEEK (3 POINTS)

          POINTS3

          INTERPRETATIONNEGATIVE

           

           

          OCCUPATION: .

           

           

          SEXUAL HX

          HAD SEX IN THE LAST 12 MONTHS (VAGINAL, ORAL, OR ANAL)?YES

          WITHWOMEN ONLY

           

           

          REVIEWED WITH PATIENT 19 1426 NLJ.

           

HOSPITALIZATION/MAJOR DIAGNOSTIC PROCEDURE

           

          SURGERIES AS ABOVE

           

REVIEW OF SYSTEMS

           

      REVIEWED BY:

           

          PROVIDER:    JOHAN LIU .

           

      CONSTITUTIONAL:

           

          ANY CHANGE IN YOUR MEDICAL CONDITION?    NO . CHILLS    NO .

          FEVER    NO .

           

      INFECTION:

           

          DO YOU HAVE NEW INFECTIONS?    NO . DO YOU HAVE HISTORY OF MRSA? 

            NO .

           

      MUSCULOSKELETAL:

           

          ANY NEW PATTERNS OF PAIN OR NUMBNESS?    YES- PAIN HAS RETURNED

          SINCE TPI (19), STATES THEY WORKED ABOUT 1-2 DAYS, STATES

          PAIN HAS RETURNED TO PRE-INJECTION LEVEL, STATES HE FEELS

          WEAKNESS IN LEFT LEG .

           

      GASTROENTEROLOGY:

           

          ANY NEW CHANGE IN BOWEL CONTROL?    NO .

           

      GENITOURINARY:

           

          ANY NEW CHANGE IN BLADDER CONTROL?    NO . IS THERE A CHANCE YOU

          COULD BE PREGNANT?    NO .

           

      HEMATOLOGY/LYMPH:

           

          DO YOU TAKE ANY BLOOD THINNERS? (FOR EXAMPLE- COUMADIN, PLAVIX,

          AGGRENOX, PLATEL, PRADAXA, OR XARELTO)    NO . WHEN WAS YOUR LAST

          DOSE?    DATE: TIME:  .

           

      NEUROLOGY:

           

          HAVE YOU FALLEN IN THE PAST 12 MONTHS?    NO . ANY NEW EXTREMITY

          NUMBNESS OR WEAKNESS?    NO .

           

      CARDIOLOGY:

           

          DO YOU HAVE A PACEMAKER OR DEFIBRILLATOR?    NO .

           

      RESPIRATORY:

           

          HAVE YOU BEEN SICK IN THE PAST WEEK?    NO . FEVER    NO . FLU

          LIKE SYMPTOMS?    NO . COUGH    NO .

           

      INTEGUMENTARY:

           

          DO YOU HAVE ANY RASHES OR OPEN SORES?    NO .

           

      ALLERGIC/IMMUNO:

           

          ARE YOU ALLERGIC TO IV DYE?    NO . ANY NEW ALLERGIES?    NO .

           

      PSYCHIATRIC:

           

          DO YOU HAVE THOUGHTS OF HURTING YOURSELF OR SOMEONE ELSE?    NO .

          ARE YOU ABUSED, NEGLECTED, OR IN AN UNSAFE ENVIRONMENT?    NO .

           

      ENDOCRINOLOGY:

           

          ARE YOU DIABETIC?    NO .

           

      OTHER:

           

          DO YOU NEED ANY PRESCRIPTIONS?    NO . IF YES, PLEASE LIST:   

          ____ . ANY NEW PROBLEMS WITH YOUR MEDICATIONS?    NO . WHEN DID

          YOU LAST EAT?    ____ . WHEN DID YOU LAST DRINK?    ____ . WHAT

          DID YOU LAST DRINK?    ____ . NAME OF PERSON DRIVING YOU HOME?   

          ____ . DO YOU HAVE ANY OTHER QUESTIONS OR CONCERNS    YES- LEFT

          SIDED PAIN HAS RETURNED TO PRE INJECTION LEVEL, PATIENT DOES

          STATE THAT RIGHT SIDED BACK AND LEG PAIN HAS IMPROVED .

           

VITAL SIGNS

           

          .6 LBS, HT 69.5 IN, BMI 29.49 INDEX, /72 MM HG, HR 95

          /MIN, RR 18 /MIN, TEMP 97.4 F, OXYGEN SAT % 99%, SAFE IN ENV?

          (Y/N) YES, NA INITIALS C 14:19, REVIEWED BY: CHEN.

           

EXAMINATION

           

      GENERAL EXAMINATION:

          GENERALNO ACUTE DISTRESS, WELL NOURISHED AND HYDRATED.

           

          PSYCHAPPROPRIATE MOOD AND AFFECT .

           

          LUNGS:CLEAR TO AUSCULTATION BILATERALLY, NO WHEEZES, RHONCHI,

          RALES.

           

          HEART:NO MURMURS, REGULAR RATE AND RHYTHM.

           

ASSESSMENTS

           

          LOW BACK PAIN - M54.5 (PRIMARY), CONTINUE NORCO AND

          CYCLOBENZAPRINE AS NEEDED, NO MEDICATION CHANGES BEEN MADE

           

          MYALGIA, OTHER SITE - M79.18

           

TREATMENT

           

      LOW BACK PAIN

          [facility] MRI LUMBAR W/O CONTRAST (CPT 48417)[id number]

          CLINICAL NOTES: 49 YEAR OLD MALE IN FOR TPI FOLLOW UP. GIVEN

          PRESENTING SYMPTOMS AND RESULTS OF PHYSICAL EXAMINATION

          RECOMMENDED MRI FOR FURTHER EVALUATION AND REFERRAL TO PT.

          ENCOURAGED PATIENT TO GO TO HIS PCP SHOULD THE PAIN IN HIS

          TESTICLES RETURN AND/OR HIS LEG WEAKNESS. PATIENT HAS EXPRESSED

          UNDERSTANDING OF AND WAS IN AGREEMENT WITH TREATMENT PLAN. GIVEN

          TIME TO ASK QUESTIONS AND EXPRESS CONCERNS. .

       REFERRAL TO:JOHAN FIGUEREDOHYSICAL THERAPIST

           REASON:LOW BACK PAIN WITH RADICULAR SYMPTOMS TO THE LEFT LOWER

          EXTREMITY

           

DISPOSITION & COMMUNICATION

           

FOLLOW UP

           

          POST MRI (REASON: MRI )

           

 

ELECTRONICALLY SIGNED BY KORI BOLES ON

          2019 AT 12:34 PM EDT

           

           

           

 

DISCLAIMER :

THIS IS A VISIT SUMMARY EXTRACTED FROM THE [employer] CHART.

IT IS NOT A COPY OF THE [employer] PROGRESS NOTE.

ABRAHAND

## 2019-09-16 ENCOUNTER — HOSPITAL ENCOUNTER (OUTPATIENT)
Dept: HOSPITAL 53 - M RAD | Age: 49
End: 2019-09-16
Attending: NURSE PRACTITIONER
Payer: COMMERCIAL

## 2019-09-16 DIAGNOSIS — M51.27: ICD-10-CM

## 2019-09-16 DIAGNOSIS — M51.36: Primary | ICD-10-CM

## 2019-09-16 NOTE — REPVR
EXAM: 

MR Lumbar Spine Without Contrast. 



EXAM DATE/TIME: 

9/16/2019 8:32 AM 



CLINICAL HISTORY: 

49 years old, male; Pain; Lumbago; Additional info: Low back pain 



TECHNIQUE: 

Imaging protocol: Multiplanar magnetic resonance images of the lumbar spine 

without intravenous contrast. 



COMPARISON: 

MRI-Spine, L.S. without con 10/5/2012 4:44 PM (report not provided) 



FINDINGS: 

 Vertebral body heights are intact. 

 Alignment is maintained. 

 No pars defect is identified. 

 The conus is unremarkable in appearance, with its tip at the L1 level. 

 There are again mild degrees of disc desiccation indicating intervertebral 

disc degeneration. 

 The visualized abdominal structures appear unremarkable. 



 L1-2: No significant disc displacement. 

 L2-3: No significant disc displacement. 

 L3-4: No significant disc displacement. There is again small fluid in the 

facet joints. 

 L4-5: Similar small bulge combining with facet arthrosis to lead to very mild 

right neural foraminal narrowing with very mild right lateral recess narrowing, 

without significant central canal stenosis. There is again small fluid in the 

facet joints. 

 L5-S1: Similar diffuse bulge combining with facet arthrosis to lead to mild 

right and very mild left neural foraminal narrowing with very mild right 

lateral recess narrowing, without significant central canal stenosis. There is 

again small fluid in the facet joints. A 10 x 10 mm lobulated synovial cyst 

extending posteriorly from the left facet joint appears mildly larger 

(previously 6 x 6 mm). 



 If surgery is considered, recommend level confirmation. 



IMPRESSION: 

1. Mild multilevel disc desiccation indicating intervertebral disk degeneration 

with disc displacements as described. The appearance is similar as compared 

with 10/5/12. 

2. Mildly larger lobulated synovial cyst extending posteriorly from the left 

L5-S1 facet joint.



Electronically signed by: Godwin Friedman On 09/16/2019  13:25:55 PM

## 2019-10-04 ENCOUNTER — HOSPITAL ENCOUNTER (OUTPATIENT)
Dept: HOSPITAL 53 - M PAIN | Age: 49
End: 2019-10-04
Attending: NURSE PRACTITIONER
Payer: COMMERCIAL

## 2019-10-04 DIAGNOSIS — G89.29: ICD-10-CM

## 2019-10-04 DIAGNOSIS — M46.1: Primary | ICD-10-CM

## 2019-10-04 DIAGNOSIS — F17.220: ICD-10-CM

## 2019-10-04 DIAGNOSIS — Z88.0: ICD-10-CM

## 2019-10-04 DIAGNOSIS — G43.909: ICD-10-CM

## 2019-10-04 DIAGNOSIS — E78.5: ICD-10-CM

## 2019-10-04 DIAGNOSIS — Z79.899: ICD-10-CM

## 2019-10-04 DIAGNOSIS — G47.00: ICD-10-CM

## 2019-10-04 DIAGNOSIS — I10: ICD-10-CM

## 2019-10-04 DIAGNOSIS — Z88.8: ICD-10-CM

## 2019-10-04 DIAGNOSIS — Z86.59: ICD-10-CM

## 2019-10-04 DIAGNOSIS — G25.0: ICD-10-CM

## 2019-10-07 NOTE — ECWPNPC
PATIENT NAME: AYLEEN HERNANDEZ

: 1970

GENDER: MALE

MRN: [id number]

VISIT DATE: 10/04/2019

DISCHARGE DATE: 10/04/19 1418

VISIT LOCKED DATE TIME: 

PHYSICIAN: DEBBY ULLOA 

RESOURCE: DEBBY ULLOA 

 

           

           

REASON FOR APPOINTMENT

           

          1. REVIEW MRI

           

HISTORY OF PRESENT ILLNESS

           

      HISTORY OF PRESENT ILLNESS:

      PAIN

           

           

          THE PATIENT DESCRIBES THE PAIN...

           

           49-YEAR-OLD MALE IN FOR CHRONIC PAIN FOLLOW-UP AND TO REVIEW

          RECENT MRI. HE RATES HIS PAIN CURRENTLY AT A 5 OUT OF 10 AND

          DESCRIBES IT AS ACHING, SHARP, AND STABBING.

           

      FALL RISK SCREENING:

      SCREENING

           

           

          :NO FALLS REPORTED IN THE LAST YEAR

           

CURRENT MEDICATIONS

           

          TAKING LISINOPRIL 40 MG TABLET 1 TABLET ORALLY ONCE A DAY

          TAKING SIMVASTATIN 10 MG TABLET 1 TABLET IN THE EVENING ORALLY

          ONCE A DAY

          TAKING ZOMIG 5 MG TABLET 1 TABLET AS NEEDED ONE TIME ORALLY ONCE

          A DAY

          TAKING ZOLPIDEM TARTRATE 10 MG TABLET 1 TABLET ORALLY ONCE A DAY

          AT BEDTIME

          TAKING CYCLOBENZAPRINE HCL 10 MG TABLET 1 TABLET ORALLY THREE

          TIMES A DAY AS NEEDED FOR BACK PAIN

          TAKING LIDOCAINE 5 % OINTMENT APPLY TO LOW BACK EXTERNALLY THREE

          TIMES A DAY

          TAKING NORCO 5-325 MG TABLET 1 TABLET ORALLY THREE TIMES A DAY AS

          NEEDED, MDD=3

          MEDICATION LIST REVIEWED AND RECONCILED WITH THE PATIENT

           

PAST MEDICAL HISTORY

           

          RECURRENT LOW BACK PAIN (MRI NEGATIVE FOR DISC DISEASE)

          DEPRESSION

          INSOMNIA

          MIGRAINES

          RIGHT SHOULDER PAIN

          HYPERLIPIDEMIA

          ESSENTIAL TREMORS

          PAIN RIGHT ELBOW

          ESSENTIAL HYPERTENSION

           

ALLERGIES

           

          PENICILLIN V-POTASSIUM: HIVES - ALLERGY

          LEVAQUIN: MUSCLE PAIN - SIDE EFFECTS

          PROZAC: MUSCLE AND JOINT PAIN - SIDE EFFECTS - ONSET DATE

          2019

           

SURGICAL HISTORY

           

          LAPAROSCOPIC APPENDECTOMY 

          CHOLECYSTECTOMY 

          BILATERAL CARPAL REPAIR WITH ULNAR NERVE REPOSITIONING 

          RIGHT SHOULDER DECOMPRESSION 2016

           

FAMILY HISTORY

           

          FATHER:  34 YRS, LIVER CANCER

          MOTHER: ALIVE, HYPERTENSION

          2DAUGHTER(S) - HEALTHY.

           

SOCIAL HISTORY

           

          GENERAL:

           

          TOBACCO USE

          ARE YOU A:NONSMOKER , NEVER SMOKER

          ADDITIONAL FINDINGS: TOBACCO USERCHEWS LOOSE LEAF TOBACCO

          COUNCELED ON THE IMPORTANCE OF NOT CHEWING TOBACCO

           

           

          HIV / HEP-C SCREENING

          HIV TEST OFFERED TO PATIENT:YES

          DATE OFFERED:2017

          TEST ACCEPTED:NO

          HEP-C TEST OFFERED TO PATIENT:YES

          DATE OFFERED:2017

          REASON:PATIENT DECLINED

          TEST ACCEPTED:NO

          REASON:PATIENT DECLINED

           

           

          OTHERS AT HOME: WIFE, CHILDREN.

           

           

          EDUCATION

          LEVEL OF EDUCATION:FINISHED [employer]

           

           

          DIET: LOW FAT, LOW CHOLESTEROL.

           

           

          LANGUAGE

          LANGUAGES SPOKEN:ENGLISH

           

           

          DOMESTIC VIOLENCE

          DO YOU FEEL SAFE IN YOUR ENVIRONMENT?YES

           

           

          BMI CARE GOAL FOLLOW-UP

          ABOVE NORMAL BMI FOLLOW-UPDIETARY MANAGEMENT EDUCATION, GUIDANCE,

          AND COUNSELING

           

           

          RECREATIONAL DRUG USE  DENIES.

           

           

          EXERCISE: NO REGULAR EXERCISE.

           

           

          LEARNING BARRIERS / SPECIAL NEEDS

          CHANGE FROM LAST VISIT?NO

          BARRIERS TO LEARNING?NO

          HEARING IMPAIRED?NO

          VISION IMPAIRED?YES

          COGNITIVELY IMPAIRED?NO

          :CORRECTIVE LENSES

          READINESS TO LEARN?YES

          LEARNING PREFERENCES?NO

          LEARNING CAPABILITIES PRESENT?YES

          EMOTIONAL BARRIERS?NO

          SPECIAL DEVICES?NO

           NEEDED?NO

           

           

          LUNG CANCER SCREENING

          SMOKING STATUS:NON SMOKER

           

           

          PAIN CLINIC PFS, CLERGY, PUBLIC HEALTH REFERRALS

          HAS THE PATIENT BEEN EDUCATED REGARDING HIS/HER PLAN OF CARE?YES

          HAS THE PATIENT BEEN EDUCATED REGARDING PAIN, THE RISK FOR PAIN,

          THE IMPORTANCE OF EFFECTIVE PAIN MANAGEMENT, AND THE PAIN

          ASSESSMENT PROCESS?YES

           

           

          LATEX QUESTIONNAIRE

          LATEX ALLERGY : HAVE YOU EVER DEVELOPED ANY TYPE OF REACTION

          AFTER HANDLING LATEX PRODUCTS SUCH AS RUBBER GLOVES, CONDOMS,

          DIAPHRAGMS, BALLOONS, SOCKS, OR UNDERWEAR?NO

          LATEX ALLERGY : HAVE YOU EVER DEVELOPED ANY TYPE OF REACTION

          DURING OR AFTER DENTAL APPOINTMENT, VAGINAL/RECTAL EXAMINATION,

          SURGICAL PROCEDURE, OR ANY OTHER EXPOSURE?NO

          DATE ASKED : 2019

          LATEX RISK : HAVE YOU EVER HAD ANY DIFFICULTY BREATHING OR HIVES

          AFTER EATING OR HANDLING ANY FRUITS, OR VEGETABLES; SUCH AS KIWI,

          BANANAS, STONE FRUITS, OR CHESTNUTSNO

          LATEX RISK : DO YOU HAVE A PREVIOUS PERSONAL HISTORY OF MORE THAN

          NINE SURGERIES, SPINA BIFIDA, OR REPEATED CATHERIZATIONS? NO

          LATEX RISK : ARE YOU FREQUENTLY EXPOSED TO LATEX PRODUCTS IN YOUR

          OCCUPATION?NO

           

           

          CAFFEINE

          CAFFEINE USE?YES

          HOW OFTEN AND HOW MUCH? 2-3 COFFEE/DAY

           

           

          ADVANCE DIRECTIVE

          ADVANCE DIRECTIVE DISCUSSED WITH PATIENT:YES PT DOES NOT HAVE ANY

          ADVANCE DIRECTIVES AND HE DECLINES INFORMATION ON HCP AT THIS

          TIME

           

           

          Protestant

          [id number] Religious

           

           

          MARITAL STATUS: , 2 CHILDREN.

           

           

          ALCOHOL SCREENING

          DID YOU HAVE A DRINK CONTAINING ALCOHOL IN THE PAST YEAR?YES

          HOW OFTEN DID YOU HAVE SIX OR MORE DRINKS ON ONE OCCASION IN THE

          PAST YEAR?NEVER (0 POINTS)

          HOW MANY DRINKS DID YOU HAVE ON A TYPICAL DAY WHEN YOU WERE

          DRINKING IN THE PAST YEAR?1 OR 2 (0 POINTS)

          HOW OFTEN DID YOU HAVE A DRINK CONTAINING ALCOHOL IN THE PAST

          YEAR?TWO TO THREE TIMES PER WEEK (3 POINTS)

          POINTS3

          INTERPRETATIONNEGATIVE

           

           

          OCCUPATION: .

           

           

          SEXUAL HX

          HAD SEX IN THE LAST 12 MONTHS (VAGINAL, ORAL, OR ANAL)?YES

          WITHWOMEN ONLY

           

           

          REVIEWED WITH PATIENT 19 1426 NLJREVIEWED WITH PATIENT

          10/4/19 1308 LAS.

           

HOSPITALIZATION/MAJOR DIAGNOSTIC PROCEDURE

           

          SURGERIES AS ABOVE

           

REVIEW OF SYSTEMS

           

      REVIEWED BY:

           

          PROVIDER:    JOHAN LIU .

           

      CONSTITUTIONAL:

           

          ANY CHANGE IN YOUR MEDICAL CONDITION?    NO . CHILLS    NO .

          FEVER    NO .

           

      INFECTION:

           

          DO YOU HAVE NEW INFECTIONS?    NO . DO YOU HAVE HISTORY OF MRSA? 

            NO .

           

      MUSCULOSKELETAL:

           

          ANY NEW PATTERNS OF PAIN OR NUMBNESS?    YES PT REPORTS THAT

          WITHIN THE LAST 4 WEEKS HE IS EXPERIENCING INCREASED PAIN, LEG

          CRAMPS IN BOTH LEGS, WEAKNESS IN BOTH LEGS AND TRANSIENT NUMBNESS

          AND TINGLING IN BOTH FEET. DURING PHYSICAL THERAPY STRETCHES, THE

          PAIN IS RELIEVED, BUT RETURNS WORSE AFTERWARDS. .

           

      GASTROENTEROLOGY:

           

          ANY NEW CHANGE IN BOWEL CONTROL?    NO .

           

      GENITOURINARY:

           

          ANY NEW CHANGE IN BLADDER CONTROL?    YES PT REPORTS THAT OVER

          THE PAST SEVERAL MONTHS, HE HAS SEVERAL EPISODES NIGHTLY WHERE HE

          HAS TO GET UP AT NIGHT TO URINATE. . IS THERE A CHANCE YOU COULD

          BE PREGNANT?    NO .

           

      HEMATOLOGY/LYMPH:

           

          DO YOU TAKE ANY BLOOD THINNERS? (FOR EXAMPLE- COUMADIN, PLAVIX,

          AGGRENOX, PLATEL, PRADAXA, OR XARELTO)    NO . WHEN WAS YOUR LAST

          DOSE?    DATE: TIME:  .

           

      NEUROLOGY:

           

          HAVE YOU FALLEN IN THE PAST 12 MONTHS?    NO . ANY NEW EXTREMITY

          NUMBNESS OR WEAKNESS?    YES SEE ABOVE NOTE .

           

      CARDIOLOGY:

           

          DO YOU HAVE A PACEMAKER OR DEFIBRILLATOR?    NO .

           

      RESPIRATORY:

           

          HAVE YOU BEEN SICK IN THE PAST WEEK?    NO . FEVER    NO . FLU

          LIKE SYMPTOMS?    NO . COUGH    NO .

           

      INTEGUMENTARY:

           

          DO YOU HAVE ANY RASHES OR OPEN SORES?    NO .

           

      ALLERGIC/IMMUNO:

           

          ARE YOU ALLERGIC TO IV DYE?    NO . ANY NEW ALLERGIES?    NO .

           

      PSYCHIATRIC:

           

          DO YOU HAVE THOUGHTS OF HURTING YOURSELF OR SOMEONE ELSE?    NO .

          ARE YOU ABUSED, NEGLECTED, OR IN AN UNSAFE ENVIRONMENT?    NO .

           

      ENDOCRINOLOGY:

           

          ARE YOU DIABETIC?    NO .

           

      OTHER:

           

          DO YOU NEED ANY PRESCRIPTIONS?    NO . IF YES, PLEASE LIST:   

          ____ . ANY NEW PROBLEMS WITH YOUR MEDICATIONS?    NO . WHEN DID

          YOU LAST EAT?    ____ . WHEN DID YOU LAST DRINK?    ____ . WHAT

          DID YOU LAST DRINK?    ____ . NAME OF PERSON DRIVING YOU HOME?   

          ____ . DO YOU HAVE ANY OTHER QUESTIONS OR CONCERNS    NO .

           

VITAL SIGNS

           

          .6 LBS, HT 69.5 IN, BMI 28.90 INDEX, /82 MM HG, HR 98

          /MIN, RR 16 /MIN, TEMP 98.0 F, OXYGEN SAT % 99%, SAFE IN ENV?

          (Y/N) YES, NA INITIALS AW 1311, REVIEWED BY: NITA.

           

EXAMINATION

           

      GENERAL EXAMINATION:

          GENERALNO ACUTE DISTRESS, WELL NOURISHED AND HYDRATED.

           

          PSYCHAPPROPRIATE MOOD AND AFFECT .

           

          LUNGS:CLEAR TO AUSCULTATION BILATERALLY, NO WHEEZES, RHONCHI,

          RALES.

           

          HEART:NO MURMURS, REGULAR RATE AND RHYTHM.

           

          BACK:DENIES POINT TENDERNESS ALONG LUMBAR SPINE. HE DOES ENDORSE

          TENDERNESS BILATERALLY OVER THE SIJ'S LELA'S TEST WAS

          POSITIVE. .

           

ASSESSMENTS

           

          BILATERAL SACROILIITIS - M46.1 (PRIMARY)

           

TREATMENT

           

      BILATERAL SACROILIITIS

          NOTES: BILATERAL SIJREVIEWED SIJ PROCEDURE WITH PT, DISCUSSED

          PRE-PROCEDURE INSTRUCTIONS, GAVE PT WRITTEN MATERIAL, PT

          ACKNOWLEDGED UNDERSTANDING. DS.

          CLINICAL NOTES: 49-YEAR-OLD MALE IN FOR CHRONIC PAIN FOLLOW-UP

          AND TO REVIEW RECENT MRI. GIVEN PRESENTING SYMPTOMS AND RESULTS

          PHYSICAL EXAMINATION RECOMMENDED BILATERAL SIJ WITH

          POSTPROCEDURAL FOLLOW-UP. MRI RESULTS WERE REVIEWED WITH PATIENT.

          PATIENT HAS EXPRESSED UNDERSTANDING OF AND WAS IN AGREEMENT WITH

          TREATMENT PLAN. GIVEN TIME TO ASK QUESTIONS AND EXPRESS CONCERNS.

           

PREVENTIVE MEDICINE

           

      PAIN CLINIC TEACHING:

           

          THE PATIENT HAS BEEN EDUCATED REGARDING HIS/HER PLAN OF CARE

          : REVIEWED WRITTEN MATERIALS REGARDING PROCEDURE AND DISTRIBUTED

          MATERIAL ON PRE-PROCEDURE TEACHING, PT ACKNOWLEDGED

          UNDERSTANDING.

           

PROCEDURE CODES

           

           ESTABILISHED PATIENT [facility] CHARGE

           

DISPOSITION & COMMUNICATION

           

FOLLOW UP

           

          POSTPROCEDURE (REASON: BILATERAL SIJ )

           

 

ELECTRONICALLY SIGNED BY KORI BOLES ON

          10/07/2019 AT 09:20 AM EDT

           

           

           

 

DISCLAIMER :

THIS IS A VISIT SUMMARY EXTRACTED FROM THE [employer] CHART.

IT IS NOT A COPY OF THE [employer] PROGRESS NOTE.

VIOLETTE

## 2019-10-08 ENCOUNTER — HOSPITAL ENCOUNTER (EMERGENCY)
Dept: HOSPITAL 53 - M ED | Age: 49
Discharge: HOME | End: 2019-10-08
Payer: COMMERCIAL

## 2019-10-08 VITALS — BODY MASS INDEX: 26.87 KG/M2 | HEIGHT: 72 IN | WEIGHT: 198.42 LBS

## 2019-10-08 VITALS — DIASTOLIC BLOOD PRESSURE: 94 MMHG | SYSTOLIC BLOOD PRESSURE: 148 MMHG

## 2019-10-08 DIAGNOSIS — M46.1: Primary | ICD-10-CM

## 2019-10-08 DIAGNOSIS — E78.5: ICD-10-CM

## 2019-10-08 DIAGNOSIS — Z79.899: ICD-10-CM

## 2019-10-08 DIAGNOSIS — G43.909: ICD-10-CM

## 2019-10-08 DIAGNOSIS — I10: ICD-10-CM

## 2019-10-08 DIAGNOSIS — M51.9: ICD-10-CM

## 2019-10-08 DIAGNOSIS — Z88.0: ICD-10-CM

## 2019-10-08 DIAGNOSIS — R25.1: ICD-10-CM

## 2019-10-08 LAB
BASOPHILS # BLD AUTO: 0.1 10^3/UL (ref 0–0.2)
BASOPHILS NFR BLD AUTO: 0.7 % (ref 0–1)
BUN SERPL-MCNC: 13 MG/DL (ref 7–18)
CALCIUM SERPL-MCNC: 8.9 MG/DL (ref 8.5–10.1)
CHLORIDE SERPL-SCNC: 106 MEQ/L (ref 98–107)
CO2 SERPL-SCNC: 24 MEQ/L (ref 21–32)
CREAT SERPL-MCNC: 0.9 MG/DL (ref 0.7–1.3)
CRP SERPL-MCNC: < 0.3 MG/DL (ref 0–0.3)
EOSINOPHIL # BLD AUTO: 0 10^3/UL (ref 0–0.5)
EOSINOPHIL NFR BLD AUTO: 0.6 % (ref 0–3)
ERYTHROCYTE [SEDIMENTATION RATE] IN BLOOD BY WESTERGREN METHOD: 6 MM/HR (ref 0–20)
GFR SERPL CREATININE-BSD FRML MDRD: > 60 ML/MIN/{1.73_M2} (ref 56–?)
GLUCOSE SERPL-MCNC: 110 MG/DL (ref 70–100)
HCT VFR BLD AUTO: 41.6 % (ref 42–52)
HGB BLD-MCNC: 14 G/DL (ref 13.5–17.5)
LYMPHOCYTES # BLD AUTO: 1.5 10^3/UL (ref 1.5–5)
LYMPHOCYTES NFR BLD AUTO: 22.1 % (ref 24–44)
MCH RBC QN AUTO: 30.9 PG (ref 27–33)
MCHC RBC AUTO-ENTMCNC: 33.7 G/DL (ref 32–36.5)
MCV RBC AUTO: 91.8 FL (ref 80–96)
MONOCYTES # BLD AUTO: 0.6 10^3/UL (ref 0–0.8)
MONOCYTES NFR BLD AUTO: 8.7 % (ref 0–5)
NEUTROPHILS # BLD AUTO: 4.7 10^3/UL (ref 1.5–8.5)
NEUTROPHILS NFR BLD AUTO: 67.6 % (ref 36–66)
PLATELET # BLD AUTO: 213 10^3/UL (ref 150–450)
POTASSIUM SERPL-SCNC: 4.3 MEQ/L (ref 3.5–5.1)
RBC # BLD AUTO: 4.53 10^6/UL (ref 4.3–6.1)
SODIUM SERPL-SCNC: 137 MEQ/L (ref 136–145)
WBC # BLD AUTO: 6.9 10^3/UL (ref 4–10)

## 2019-10-08 PROCEDURE — 72156 MRI NECK SPINE W/O & W/DYE: CPT

## 2019-10-08 PROCEDURE — 85652 RBC SED RATE AUTOMATED: CPT

## 2019-10-08 PROCEDURE — 85025 COMPLETE CBC W/AUTO DIFF WBC: CPT

## 2019-10-08 PROCEDURE — 72157 MRI CHEST SPINE W/O & W/DYE: CPT

## 2019-10-08 PROCEDURE — 86140 C-REACTIVE PROTEIN: CPT

## 2019-10-08 PROCEDURE — 70553 MRI BRAIN STEM W/O & W/DYE: CPT

## 2019-10-08 PROCEDURE — 99284 EMERGENCY DEPT VISIT MOD MDM: CPT

## 2019-10-08 PROCEDURE — 36415 COLL VENOUS BLD VENIPUNCTURE: CPT

## 2019-10-08 PROCEDURE — 80048 BASIC METABOLIC PNL TOTAL CA: CPT

## 2019-10-08 PROCEDURE — 72158 MRI LUMBAR SPINE W/O & W/DYE: CPT

## 2019-10-08 NOTE — REPVR
PROCEDURE INFORMATION: 

Exam: MR Thoracic Spine Without and With Contrast 

Exam date and time: 10/8/2019 6:02 PM 

Clinical history: 49 years old, male; Weakness; Additional info: Myelopathy; Ms 

versus inflammatory 



TECHNIQUE: 

Imaging protocol: Multiplanar magnetic resonance images of the thoracic spine 

without and with intravenous contrast. 

Contrast material: PROHANCE; Contrast volume: 18 ml; Contrast route: IV;  



COMPARISON: 

No relevant prior studies available. 



FINDINGS: 

Vertebrae: Unremarkable. 

Spinal cord: Normal signal. No cord compression. 

T1-T2:  No significant disc disease. No significant spinal stenosis. 

T2-T3:  No significant disc disease. No significant spinal stenosis. 

T3-T4:  No significant disc disease. No significant spinal stenosis. 

T4-T5:  No significant disc disease. No significant spinal stenosis. 

T5-T6:  No significant disc disease. No significant spinal stenosis. 

T6-T7:  No significant disc disease. No significant spinal stenosis. 

T7-T8:  No significant disc disease. No significant spinal stenosis. 

T8-T9:  No significant disc disease. No significant spinal stenosis. 

T9-T10:  No significant disc disease. No significant spinal stenosis. 

T10-T11:  No significant disc disease. No significant spinal stenosis. 

T11-T12:  No significant disc disease. No significant spinal stenosis. 



Soft tissues: Unremarkable. 



IMPRESSION: 

Unremarkable spine. 



Electronically signed by: Won Arguelles On 10/08/2019  19:09:18 PM

## 2019-10-08 NOTE — REPVR
PROCEDURE INFORMATION: 

Exam: MR Head Without and With Contrast 

Exam date and time: 10/8/2019 6:02 PM 

Clinical history: 49 years old, male; Walking, difficulty and weakness, 

extremity and other: Trembling legs; Bilateral; Additional info: Myelopathy; Ms 

versus inflammatory 



TECHNIQUE: 

Imaging protocol: MR of the head without and with intravenous contrast. 

3D rendering: MIP reconstructed images were created and reviewed. 

Contrast material: PROHANCE; Contrast volume: 18 ml; Contrast route: IV;  



COMPARISON: 

No relevant prior studies available. 



FINDINGS: 

Brain: Normal. No acute infarct. No hemorrhage. No significant white matter 

disease. No edema.  

Ventricles: Normal. No ventriculomegaly. 

Bones/joints: Unremarkable. 

Soft tissues: Unremarkable. 

Sinuses: Normal as visualized. No acute sinusitis. 

Mastoid air cells: Normal as visualized. No mastoid effusion. 

Orbits: Unremarkable. 





IMPRESSION: 

No acute findings. 



Electronically signed by: Won Arguelles On 10/08/2019  19:07:01 PM

## 2019-10-08 NOTE — REPVR
PROCEDURE INFORMATION: 

Exam: MR Cervical Spine Without and With Contrast 

Exam date and time: 10/8/2019 6:02 PM 

Clinical history: 49 years old, male; Weakness and other: Trembling legs; 

Additional info: Myelopathy; Ms versus inflammatory 



TECHNIQUE: 

Imaging protocol: Multiplanar magnetic resonance images of the cervical spine 

without and with intravenous contrast. 

Contrast material: PROHANCE; Contrast volume: 18 ml; Contrast route: IV;  



COMPARISON: 

No relevant prior studies available. 



FINDINGS: 

Vertebrae: Unremarkable. 

Spinal cord: Normal signal. No cord compression. 

C2-C3: No significant disc disease. No significant spinal stenosis. 

C3-C4: Mild annular bulge C3-C4 without neural compromise. 

C4-C5: Mild annular bulge C4-C5 without neural compromise. Mild foraminal 

narrowing on the left. 

C5-C6: Mild annular bulge C5-C6 without neural compromise. Mild foraminal 

narrowing on the left. 

C6-C7: Mild annular bulge C6-C7 without neural compromise. 

C7-T1: No significant disc disease. No significant spinal stenosis. 



Vertebral arteries: Expected flow voids in the vertebral arteries. 

Soft tissues: Unremarkable. No abnormal enhancement.



IMPRESSION: 

Mild annular bulges from C3-4 through C6-7 without neural compromise. Mild 

neural foraminal narrowing on the left at C4-5 and C5-6.



Electronically signed by: Won Arguelles On 10/08/2019  19:13:26 PM

## 2019-10-11 NOTE — ED PDOC
Post-Departure Follow-Up


mri ls spine faxed to [facility] Lundborg-Gray,Maja MD          Oct 11, 2019 07:22

## 2019-11-04 ENCOUNTER — HOSPITAL ENCOUNTER (OUTPATIENT)
Dept: HOSPITAL 53 - M PAIN | Age: 49
End: 2019-11-04
Attending: ANESTHESIOLOGY
Payer: COMMERCIAL

## 2019-11-04 DIAGNOSIS — G43.909: ICD-10-CM

## 2019-11-04 DIAGNOSIS — G25.0: ICD-10-CM

## 2019-11-04 DIAGNOSIS — Z88.0: ICD-10-CM

## 2019-11-04 DIAGNOSIS — M46.1: Primary | ICD-10-CM

## 2019-11-04 DIAGNOSIS — F17.220: ICD-10-CM

## 2019-11-04 DIAGNOSIS — Z88.8: ICD-10-CM

## 2019-11-04 DIAGNOSIS — Z79.899: ICD-10-CM

## 2019-11-04 DIAGNOSIS — Z86.59: ICD-10-CM

## 2019-11-04 DIAGNOSIS — I10: ICD-10-CM

## 2019-11-04 DIAGNOSIS — E78.5: ICD-10-CM

## 2019-11-04 DIAGNOSIS — G47.00: ICD-10-CM

## 2019-11-04 NOTE — REP
SI joint series:  Four views bilateral.

 

History:  Bilateral SI joint injection for pain.

 

17 seconds of fluoroscopy time is reported.

 

Findings:  A sequence of four last image hold fluoroscopically obtained spot

radiographs document various needle positions associated with SI joint

injection.

 

 

Electronically Signed by

Monty Joyce MD 11/04/2019 01:16 P

## 2019-11-19 NOTE — ECWPNPC
PATIENT NAME: AYLEEN HERNANDEZ

: 1970

GENDER: MALE

MRN: [id number]

VISIT DATE: 2019

DISCHARGE DATE: 19 1314

VISIT LOCKED DATE TIME: 

PHYSICIAN: GABBY DURAN MD

RESOURCE: GABBY DURAN MD

 

           

           

REASON FOR APPOINTMENT

           

          1. BILATERAL SIJ

           

HISTORY OF PRESENT ILLNESS

           

      HISTORY OF PRESENT ILLNESS:

      PAIN

           

           

          THE PATIENT DESCRIBES THE PAIN...

           

      FALL RISK SCREENING:

      SCREENING

           

           

          :NO FALLS REPORTED IN THE LAST YEAR

           

CURRENT MEDICATIONS

           

          TAKING LISINOPRIL 40 MG TABLET 1 TABLET ORALLY ONCE A DAY

          TAKING SIMVASTATIN 10 MG TABLET 1 TABLET IN THE EVENING ORALLY

          ONCE A DAY

          TAKING ZOMIG 5 MG TABLET 1 TABLET AS NEEDED ONE TIME ORALLY ONCE

          A DAY

          TAKING ZOLPIDEM TARTRATE 10 MG TABLET 1 TABLET ORALLY ONCE A DAY

          AT BEDTIME

          TAKING CYCLOBENZAPRINE HCL 10 MG TABLET 1 TABLET ORALLY THREE

          TIMES A DAY AS NEEDED FOR BACK PAIN, NOTES: 11/3/19 2100

          TAKING LIDOCAINE 5 % OINTMENT APPLY TO LOW BACK EXTERNALLY THREE

          TIMES A DAY

          TAKING NORCO 5-325 MG TABLET 1 TABLET ORALLY THREE TIMES A DAY AS

          NEEDED, MDD=3, NOTES: 19 0700

          MEDICATION LIST REVIEWED AND RECONCILED WITH THE PATIENT

           

PAST MEDICAL HISTORY

           

          RECURRENT LOW BACK PAIN (MRI NEGATIVE FOR DISC DISEASE)

          DEPRESSION

          INSOMNIA

          MIGRAINES

          RIGHT SHOULDER PAIN

          HYPERLIPIDEMIA

          ESSENTIAL TREMORS

          PAIN RIGHT ELBOW

          ESSENTIAL HYPERTENSION

           

ALLERGIES

           

          PENICILLIN V-POTASSIUM: HIVES - ALLERGY

          LEVAQUIN: MUSCLE PAIN - SIDE EFFECTS

          PROZAC: MUSCLE AND JOINT PAIN - SIDE EFFECTS - ONSET DATE

          2019

           

SURGICAL HISTORY

           

          LAPAROSCOPIC APPENDECTOMY 

          CHOLECYSTECTOMY 

          BILATERAL CARPAL REPAIR WITH ULNAR NERVE REPOSITIONING 

          RIGHT SHOULDER DECOMPRESSION 2016

           

FAMILY HISTORY

           

          FATHER:  34 YRS, LIVER CANCER

          MOTHER: ALIVE, HYPERTENSION

          2DAUGHTER(S) - HEALTHY.

           

SOCIAL HISTORY

           

          GENERAL:

           

          TOBACCO USE

          ARE YOU A:NONSMOKER , NEVER SMOKER

          ADDITIONAL FINDINGS: TOBACCO USERCHEWS LOOSE LEAF TOBACCO

          COUNCELED ON THE IMPORTANCE OF NOT CHEWING TOBACCO

           

           

          HIV / HEP-C SCREENING

          HIV TEST OFFERED TO PATIENT:YES

          DATE OFFERED:2017

          TEST ACCEPTED:NO

          HEP-C TEST OFFERED TO PATIENT:YES

          DATE OFFERED:2017

          REASON:PATIENT DECLINED

          TEST ACCEPTED:NO

          REASON:PATIENT DECLINED

           

           

          OTHERS AT HOME: WIFE, CHILDREN.

           

           

          EDUCATION

          LEVEL OF EDUCATION:[employer]ISHED [employer]

           

           

          DIET: LOW FAT, LOW CHOLESTEROL.

           

           

          LANGUAGE

          LANGUAGES SPOKEN:ENGLISH

           

           

          DOMESTIC VIOLENCE

          DO YOU FEEL SAFE IN YOUR ENVIRONMENT?YES

           

           

          BMI CARE GOAL FOLLOW-UP

          ABOVE NORMAL BMI FOLLOW-UPDIETARY MANAGEMENT EDUCATION, GUIDANCE,

          AND COUNSELING

           

           

          RECREATIONAL DRUG USE  DENIES.

           

           

          EXERCISE: NO REGULAR EXERCISE.

           

           

          LEARNING BARRIERS / SPECIAL NEEDS

          CHANGE FROM LAST VISIT?NO

          BARRIERS TO LEARNING?NO

          HEARING IMPAIRED?NO

          VISION IMPAIRED?YES

          COGNITIVELY IMPAIRED?NO

          :CORRECTIVE LENSES

          READINESS TO LEARN?YES

          LEARNING PREFERENCES?NO

          LEARNING CAPABILITIES PRESENT?YES

          EMOTIONAL BARRIERS?NO

          SPECIAL DEVICES?NO

           NEEDED?NO

           

           

          LUNG CANCER SCREENING

          SMOKING STATUS:NON SMOKER

           

           

          PAIN CLINIC PFS, CLERGY, PUBLIC HEALTH REFERRALS

          HAS THE PATIENT BEEN EDUCATED REGARDING HIS/HER PLAN OF CARE?YES

          HAS THE PATIENT BEEN EDUCATED REGARDING PAIN, THE RISK FOR PAIN,

          THE IMPORTANCE OF EFFECTIVE PAIN MANAGEMENT, AND THE PAIN

          ASSESSMENT PROCESS?YES

           

           

          LATEX QUESTIONNAIRE

          LATEX ALLERGY : HAVE YOU EVER DEVELOPED ANY TYPE OF REACTION

          AFTER HANDLING LATEX PRODUCTS SUCH AS RUBBER GLOVES, CONDOMS,

          DIAPHRAGMS, BALLOONS, SOCKS, OR UNDERWEAR?NO

          LATEX ALLERGY : HAVE YOU EVER DEVELOPED ANY TYPE OF REACTION

          DURING OR AFTER DENTAL APPOINTMENT, VAGINAL/RECTAL EXAMINATION,

          SURGICAL PROCEDURE, OR ANY OTHER EXPOSURE?NO

          DATE ASKED : 2019

          LATEX RISK : HAVE YOU EVER HAD ANY DIFFICULTY BREATHING OR HIVES

          AFTER EATING OR HANDLING ANY FRUITS, OR VEGETABLES; SUCH AS KIWI,

          BANANAS, STONE FRUITS, OR CHESTNUTSNO

          LATEX RISK : DO YOU HAVE A PREVIOUS PERSONAL HISTORY OF MORE THAN

          NINE SURGERIES, SPINA BIFIDA, OR REPEATED CATHERIZATIONS? NO

          LATEX RISK : ARE YOU FREQUENTLY EXPOSED TO LATEX PRODUCTS IN YOUR

          OCCUPATION?NO

           

           

          CAFFEINE

          CAFFEINE USE?YES

          HOW OFTEN AND HOW MUCH? 2-3 COFFEE/DAY

           

           

          ADVANCE DIRECTIVE

          ADVANCE DIRECTIVE DISCUSSED WITH PATIENT:YES PT DOES NOT HAVE ANY

          ADVANCE DIRECTIVES AND HE DECLINES INFORMATION ON HCP AT THIS

          TIME

           

           

          Rastafari

          [id number] Hinduism

           

           

          MARITAL STATUS: , 2 CHILDREN.

           

           

          ALCOHOL SCREENING

          DID YOU HAVE A DRINK CONTAINING ALCOHOL IN THE PAST YEAR?YES

          HOW OFTEN DID YOU HAVE SIX OR MORE DRINKS ON ONE OCCASION IN THE

          PAST YEAR?NEVER (0 POINTS)

          HOW MANY DRINKS DID YOU HAVE ON A TYPICAL DAY WHEN YOU WERE

          DRINKING IN THE PAST YEAR?1 OR 2 (0 POINTS)

          HOW OFTEN DID YOU HAVE A DRINK CONTAINING ALCOHOL IN THE PAST

          YEAR?TWO TO THREE TIMES PER WEEK (3 POINTS)

          POINTS3

          INTERPRETATIONNEGATIVE

           

           

          OCCUPATION: .

           

           

          SEXUAL HX

          HAD SEX IN THE LAST 12 MONTHS (VAGINAL, ORAL, OR ANAL)?YES

          WITHWOMEN ONLY

           

           

          REVIEWED WITH PATIENT 19 1426 NLJREVIEWED WITH PATIENT

          10/4/19 1308 LASREVIEWED WITH PATIENT 19 LAS.

           

HOSPITALIZATION/MAJOR DIAGNOSTIC PROCEDURE

           

          SURGERIES AS ABOVE

           

REVIEW OF SYSTEMS

           

      REVIEWED BY:

           

          PROVIDER:    _____ .

           

      CONSTITUTIONAL:

           

          ANY CHANGE IN YOUR MEDICAL CONDITION?    NO . CHILLS    NO .

          FEVER    NO .

           

      INFECTION:

           

          DO YOU HAVE NEW INFECTIONS?    NO . DO YOU HAVE HISTORY OF MRSA? 

            NO .

           

      MUSCULOSKELETAL:

           

          ANY NEW PATTERNS OF PAIN OR NUMBNESS?    NO .

           

      GASTROENTEROLOGY:

           

          ANY NEW CHANGE IN BOWEL CONTROL?    NO .

           

      GENITOURINARY:

           

          ANY NEW CHANGE IN BLADDER CONTROL?    NO . IS THERE A CHANCE YOU

          COULD BE PREGNANT?    NO .

           

      HEMATOLOGY/LYMPH:

           

          DO YOU TAKE ANY BLOOD THINNERS? (FOR EXAMPLE- COUMADIN, PLAVIX,

          AGGRENOX, PLATEL, PRADAXA, OR XARELTO)    NO . WHEN WAS YOUR LAST

          DOSE?    DATE: TIME:  .

           

      NEUROLOGY:

           

          HAVE YOU FALLEN IN THE PAST 12 MONTHS?    NO . ANY NEW EXTREMITY

          NUMBNESS OR WEAKNESS?    NO .

           

      CARDIOLOGY:

           

          DO YOU HAVE A PACEMAKER OR DEFIBRILLATOR?    NO .

           

      RESPIRATORY:

           

          HAVE YOU BEEN SICK IN THE PAST WEEK?    NO . FEVER    NO . FLU

          LIKE SYMPTOMS?    NO . COUGH    NO .

           

      INTEGUMENTARY:

           

          DO YOU HAVE ANY RASHES OR OPEN SORES?    NO .

           

      ALLERGIC/IMMUNO:

           

          ARE YOU ALLERGIC TO IV DYE?    NO . ANY NEW ALLERGIES?    NO .

           

      PSYCHIATRIC:

           

          DO YOU HAVE THOUGHTS OF HURTING YOURSELF OR SOMEONE ELSE?    NO .

          ARE YOU ABUSED, NEGLECTED, OR IN AN UNSAFE ENVIRONMENT?    NO .

           

      ENDOCRINOLOGY:

           

          ARE YOU DIABETIC?    NO .

           

      OTHER:

           

          DO YOU NEED ANY PRESCRIPTIONS?    NO . IF YES, PLEASE LIST:   

          ____ . ANY NEW PROBLEMS WITH YOUR MEDICATIONS?    NO . WHEN DID

          YOU LAST EAT?    ____11/3/19 2100 . WHEN DID YOU LAST DRINK?   

          ____19 0700 . WHAT DID YOU LAST DRINK?    ____WATER . NAME

          OF PERSON DRIVING YOU HOME?    ____EVANGELIST HERNANDEZ . DO YOU HAVE

          ANY OTHER QUESTIONS OR CONCERNS    NO .

           

VITAL SIGNS

           

          .0 LBS, HT 69.5 IN, BMI 28.82 INDEX, /76 MM HG, HR 84

          /MIN, RR 16 /MIN, TEMP 98.4 F, OXYGEN SAT % 99%, SAFE IN ENV?

          (Y/N) YES, NA INITIALS AW 1037, REVIEWED BY: KATIE

           

ASSESSMENTS

           

          BILATERAL SACROILIITIS - M46.1 (PRIMARY)

           

TREATMENT

           

      BILATERAL SACROILIITIS

          [facility] FLUORO GUIDANCE (PAIN)[id number]

           

PROCEDURES

           

      PN SI

          PRE PROCEDURE DIAGNOSIS    SACROILIITIS, SACROILIAC JOINT

          DYSFUNCTION

          POST PROCEDURE DIAGNOSIS    SACROILIITIS, SACROILIAC JOINT

          DYSFUNCTION

          PROCEDURE    BILATERAL SACROILIAC JOINT BLOCK

          SURGEON    DR. GABBY DURAN

          ASSISTANT    NONE

          ANESTHESIA    LOCAL

          PRE PROCEDURE NOTE    PATIENT WITH HISTORY OF CHRONIC LOW BACK

          PAIN. I EVALUATED THE PATIENT AND REVIEWED THE CHART. I WENT OVER

          THE RISKS, ALTERNATIVES, AND BENEFITS ASSOCIATED WITH THIS

          PROCEDURE. THE PATIENT WOULD LIKE TO PROCEED AND GAVE CONSENT TO

          PERFORM THE PROCEDURE. THE PATIENT DENIES UNEXPLAINABLE WEIGHT

          LOSS, FEVER, CHILLS, OR NEW CHANGES IN URINARY OR BOWEL CONTROL

          DESCRIPTION OF PROCEDURE    THE PATIENT WAS BROUGHT TO THE

          PROCEDURE ROOM AND PLACED IN THE PRONE POSITION. THE LUMBOSACRAL

          AREA WAS CLEANED WITH CHLORAPREP SOLUTION AND DRAPED ASEPTICALLY.

          THE PROCEDURE WAS DONE UNDER STERILE CONDITIONS. I CHECKED

          LATERALITY AND THE LEVEL WHERE THE PROCEDURE WAS GOING TO BE

          PERFORMED WITH THE PATIENT AND THE SUPPORTING STAFF AT THE MOMENT

          OF THE TIME OUT IN THE PROCEDURE ROOM. UNDER FLUOROSCOPIC

          GUIDANCE, TARGET POINT WAS SELECTED AT THE LOWER BORDER OF THE

          RIGHT AND LEFT SACROILIAC JOINT. TARGET POINT WAS SELECTED AFTER

          MEDIAL ROTATION AND TILT OF THE MAGNIFIER OF THE C-ARM. LIDOCAINE

          WAS USED TO NUMB THE SKIN AND SUBCUTANEOUS TISSUE BELOW IT. A

          SPINAL NEEDLE, 22-GAUGE, WAS ADVANCED UNDER FLUOROSCOPIC GUIDANCE

          AND FOLLOWING PATIENT FEEDBACK UNTIL THE TARGET AREA WAS TOUCHED.

          THE POSITION OF THE NEEDLE WAS VERIFIED WITH AP AND LATERAL

          VIEWS. AFTER PROPER POSITION OF THE NEEDLE WAS ACHIEVED, ISOVUE M

          DYE 30%, 0.25 ML, WAS INJECTED SHOWING SPREAD OF THE DYE. THEN, A

          SOLUTION OF 20 MG OF KENALOG WAS INJECTED IN RIGHT AND LEFT JOINT

          WITH 3 ML OF BUPIVACAINE 0.125%. THERE WAS NO EVIDENCE OF BLOOD,

          PARESTHESIA OR CEREBROSPINAL FLUID DURING THE PROCEDURE. THE

          PATIENT WAS SENT TO THE RECOVERY ROOM. THE PATIENT WAS MOVING THE

          EXTREMITIES AND DOING WELL. THERE WAS NO COMPLICATION DURING THE

          PROCEDURE. FLUOROSCOPY TIME WAS 17 SECONDS

          POST PROCEDURE NOTE    THE PATIENT WILL BE SEEN IN A FOLLOW UP IN

          THE NEXT FEW WEEKS. INSTRUCTIONS WERE GIVEN, QUESTIONS WERE

          ANSWERED, AND THE PATIENT EXPRESSED UNDERSTANDING AND AGREED WITH

          THE PLAN. I, SABINO PEREZ, DOCUMENTED THE ABOVE INFORMATION

          ACTING AS A SCRIBE FOR DR. DURAN. I HAVE REVIEWED THE ABOVE

          DOCUMENT, WRITTEN BY SABINO NOLAN AND I VERIFY THAT IT

          IS ACCURATE.

           

PROCEDURE CODES

           

          96463 INJECT SACROILIAC JOINT, MODIFIERS: 50

           

          6045F RADXPS IN END [id number] PXD

           

DISPOSITION & COMMUNICATION

           

FOLLOW UP

           

          3 WEEKS

           

 

ELECTRONICALLY SIGNED BY GABBY DURAN MD, MD ON

          2019 AT 05:30 PM EST

           

           

           

 

DISCLAIMER :

THIS IS A VISIT SUMMARY EXTRACTED FROM THE [employer] CHART.

IT IS NOT A COPY OF THE [employer] PROGRESS NOTE.

MTDD

## 2019-11-21 ENCOUNTER — HOSPITAL ENCOUNTER (OUTPATIENT)
Dept: HOSPITAL 53 - M PAIN | Age: 49
End: 2019-11-21
Attending: NURSE PRACTITIONER
Payer: COMMERCIAL

## 2019-11-21 DIAGNOSIS — M46.1: Primary | ICD-10-CM

## 2019-11-23 NOTE — ECWPNPC
PATIENT NAME: AYLEEN HERNANDEZ

: 1970

GENDER: MALE

MRN: [id number]

VISIT DATE: 2019

DISCHARGE DATE: 19 1510

VISIT LOCKED DATE TIME: 

PHYSICIAN: DEBBY ULLOA 

RESOURCE: DEBBY ULLOA 

 

           

           

REASON FOR APPOINTMENT

           

          1. POST SIJ

           

HISTORY OF PRESENT ILLNESS

           

      HISTORY OF PRESENT ILLNESS:

      PAIN

           

           

          THE PATIENT DESCRIBES THE PAIN...

           

           49-YEAR-OLD MALE IN FOR POST SIJ FOLLOW-UP. HE FEELS THE

          PROCEDURE WORKED WELL OVERALL RATING HIS PAIN PREPROCEDURE AT A

          6-8 OUT OF 10 AND POSTPROCEDURE AT A 0-2 OUT OF 10. HE FEELS THE

          PROCEDURE CONTINUES TO HELP WITH HIS PAIN TODAY. HE RATES HIS

          PAIN CURRENTLY AT A 3 OUT OF 10 AND DESCRIBES IT AS ACHING,

          TENDER, AND SHOOTING.

           

      FALL RISK SCREENING:

      SCREENING

           

           

          :NO FALLS REPORTED IN THE LAST YEAR

           

CURRENT MEDICATIONS

           

          TAKING LISINOPRIL 40 MG TABLET 1 TABLET ORALLY ONCE A DAY

          TAKING SIMVASTATIN 10 MG TABLET 1 TABLET IN THE EVENING ORALLY

          ONCE A DAY

          TAKING ZOMIG 5 MG TABLET 1 TABLET AS NEEDED ONE TIME ORALLY ONCE

          A DAY

          TAKING ZOLPIDEM TARTRATE 10 MG TABLET 1 TABLET ORALLY ONCE A DAY

          AT BEDTIME

          TAKING CYCLOBENZAPRINE HCL 10 MG TABLET 1 TABLET ORALLY THREE

          TIMES A DAY AS NEEDED FOR BACK PAIN

          TAKING LIDOCAINE 5 % OINTMENT APPLY TO LOW BACK EXTERNALLY THREE

          TIMES A DAY

          TAKING NORCO 5-325 MG TABLET 1 TABLET ORALLY THREE TIMES A DAY AS

          NEEDED, MDD=3

          MEDICATION LIST REVIEWED AND RECONCILED WITH THE PATIENT

           

PAST MEDICAL HISTORY

           

          RECURRENT LOW BACK PAIN (MRI NEGATIVE FOR DISC DISEASE)

          DEPRESSION

          INSOMNIA

          MIGRAINES

          RIGHT SHOULDER PAIN

          HYPERLIPIDEMIA

          ESSENTIAL TREMORS

          PAIN RIGHT ELBOW

          ESSENTIAL HYPERTENSION

           

ALLERGIES

           

          PENICILLIN V-POTASSIUM: HIVES - ALLERGY

          LEVAQUIN: MUSCLE PAIN - SIDE EFFECTS

          PROZAC: MUSCLE AND JOINT PAIN - SIDE EFFECTS - ONSET DATE

          2019

           

SURGICAL HISTORY

           

          LAPAROSCOPIC APPENDECTOMY 

          CHOLECYSTECTOMY 

          BILATERAL CARPAL REPAIR WITH ULNAR NERVE REPOSITIONING 

          RIGHT SHOULDER DECOMPRESSION 2016

           

FAMILY HISTORY

           

          FATHER:  34 YRS, LIVER CANCER

          MOTHER: ALIVE, HYPERTENSION

          2DAUGHTER(S) - HEALTHY.

           

SOCIAL HISTORY

           

          GENERAL:

           

          TOBACCO USE

          ARE YOU A:NONSMOKER , NEVER SMOKER

          ADDITIONAL FINDINGS: TOBACCO USERCHEWS LOOSE LEAF TOBACCO

          COUNCELED ON THE IMPORTANCE OF NOT CHEWING TOBACCO

           

           

          HIV / HEP-C SCREENING

          HIV TEST OFFERED TO PATIENT:YES

          DATE OFFERED:2017

          TEST ACCEPTED:NO

          HEP-C TEST OFFERED TO PATIENT:YES

          DATE OFFERED:2017

          REASON:PATIENT DECLINED

          TEST ACCEPTED:NO

          REASON:PATIENT DECLINED

           

           

          OTHERS AT HOME: WIFE, CHILDREN.

           

           

          EDUCATION

          LEVEL OF EDUCATION:FINISHED [employer]

           

           

          DIET: LOW FAT, LOW CHOLESTEROL.

           

           

          LANGUAGE

          LANGUAGES SPOKEN:ENGLISH

           

           

          DOMESTIC VIOLENCE

          DO YOU FEEL SAFE IN YOUR ENVIRONMENT?YES

           

           

          BMI CARE GOAL FOLLOW-UP

          ABOVE NORMAL BMI FOLLOW-UPDIETARY MANAGEMENT EDUCATION, GUIDANCE,

          AND COUNSELING

           

           

          RECREATIONAL DRUG USE  DENIES.

           

           

          EXERCISE: NO REGULAR EXERCISE.

           

           

          LEARNING BARRIERS / SPECIAL NEEDS

          CHANGE FROM LAST VISIT?NO

          BARRIERS TO LEARNING?NO

          HEARING IMPAIRED?NO

          VISION IMPAIRED?YES

          COGNITIVELY IMPAIRED?NO

          :CORRECTIVE LENSES

          READINESS TO LEARN?YES

          LEARNING PREFERENCES?NO

          LEARNING CAPABILITIES PRESENT?YES

          EMOTIONAL BARRIERS?NO

          SPECIAL DEVICES?NO

           NEEDED?NO

           

           

          LUNG CANCER SCREENING

          SMOKING STATUS:NON SMOKER

           

           

          PAIN CLINIC PFS, CLERGY, PUBLIC HEALTH REFERRALS

          HAS THE PATIENT BEEN EDUCATED REGARDING HIS/HER PLAN OF CARE?YES

          HAS THE PATIENT BEEN EDUCATED REGARDING PAIN, THE RISK FOR PAIN,

          THE IMPORTANCE OF EFFECTIVE PAIN MANAGEMENT, AND THE PAIN

          ASSESSMENT PROCESS?YES

           

           

          LATEX QUESTIONNAIRE

          LATEX ALLERGY : HAVE YOU EVER DEVELOPED ANY TYPE OF REACTION

          AFTER HANDLING LATEX PRODUCTS SUCH AS RUBBER GLOVES, CONDOMS,

          DIAPHRAGMS, BALLOONS, SOCKS, OR UNDERWEAR?NO

          LATEX ALLERGY : HAVE YOU EVER DEVELOPED ANY TYPE OF REACTION

          DURING OR AFTER DENTAL APPOINTMENT, VAGINAL/RECTAL EXAMINATION,

          SURGICAL PROCEDURE, OR ANY OTHER EXPOSURE?NO

          LATEX RISK : HAVE YOU EVER HAD ANY DIFFICULTY BREATHING OR HIVES

          AFTER EATING OR HANDLING ANY FRUITS, OR VEGETABLES; SUCH AS KIWI,

          BANANAS, STONE FRUITS, OR CHESTNUTSNO

          LATEX RISK : DO YOU HAVE A PREVIOUS PERSONAL HISTORY OF MORE THAN

          NINE SURGERIES, SPINA BIFIDA, OR REPEATED CATHERIZATIONS? NO

          LATEX RISK : ARE YOU FREQUENTLY EXPOSED TO LATEX PRODUCTS IN YOUR

          OCCUPATION?NO

          DATE ASKED : 2019

           

           

          CAFFEINE

          CAFFEINE USE?YES

          HOW OFTEN AND HOW MUCH? 2-3 COFFEE/DAY

           

           

          ADVANCE DIRECTIVE

          ADVANCE DIRECTIVE DISCUSSED WITH PATIENT:YES PT DOES NOT HAVE ANY

          ADVANCE DIRECTIVES AND HE DECLINES INFORMATION ON HCP AT THIS

          TIME.

           

           

          P[employer]

          [id number]

           

           

          MARITAL STATUS: , 2 CHILDREN.

           

           

          ALCOHOL SCREENING

          DID YOU HAVE A DRINK CONTAINING ALCOHOL IN THE PAST YEAR?YES

          HOW OFTEN DID YOU HAVE SIX OR MORE DRINKS ON ONE OCCASION IN THE

          PAST YEAR?NEVER (0 POINTS)

          HOW MANY DRINKS DID YOU HAVE ON A TYPICAL DAY WHEN YOU WERE

          DRINKING IN THE PAST YEAR?1 OR 2 (0 POINTS)

          HOW OFTEN DID YOU HAVE A DRINK CONTAINING ALCOHOL IN THE PAST

          YEAR?TWO TO THREE TIMES PER WEEK (3 POINTS)

          POINTS3

          INTERPRETATIONNEGATIVE

           

           

          OCCUPATION: .

           

           

          SEXUAL HX

          HAD SEX IN THE LAST 12 MONTHS (VAGINAL, ORAL, OR ANAL)?YES

          WITHWOMEN ONLY

           

           

          REVIEWED WITH PATIENT 19 1426 NLJREVIEWED WITH PATIENT

          10/4/19 1308 LASREVIEWED WITH PATIENT 19 LASREVIEWED WITH

          PATIENT 19 1445 JS.

           

HOSPITALIZATION/MAJOR DIAGNOSTIC PROCEDURE

           

          SURGERIES AS ABOVE

           

REVIEW OF SYSTEMS

           

      REVIEWED BY:

           

          PROVIDER:    JOHAN SHEIKH-ALEJANDRO .

           

      CONSTITUTIONAL:

           

          ANY CHANGE IN YOUR MEDICAL CONDITION?    NO . CHILLS    NO .

          FEVER    NO .

           

      INFECTION:

           

          DO YOU HAVE NEW INFECTIONS?    NO . DO YOU HAVE HISTORY OF MRSA? 

            NO .

           

      MUSCULOSKELETAL:

           

          ANY NEW PATTERNS OF PAIN OR NUMBNESS?    YES, STATES PAIN HAS

          DECREASED SINCE THE INJECTION .

           

      GASTROENTEROLOGY:

           

          ANY NEW CHANGE IN BOWEL CONTROL?    NO .

           

      GENITOURINARY:

           

          ANY NEW CHANGE IN BLADDER CONTROL?    NO . IS THERE A CHANCE YOU

          COULD BE PREGNANT?    NO .

           

      HEMATOLOGY/LYMPH:

           

          DO YOU TAKE ANY BLOOD THINNERS? (FOR EXAMPLE- COUMADIN, PLAVIX,

          AGGRENOX, PLATEL, PRADAXA, OR XARELTO)    NO . WHEN WAS YOUR LAST

          DOSE?    DATE: TIME:  .

           

      NEUROLOGY:

           

          HAVE YOU FALLEN IN THE PAST 12 MONTHS?    NO . ANY NEW EXTREMITY

          NUMBNESS OR WEAKNESS?    NO .

           

      CARDIOLOGY:

           

          DO YOU HAVE A PACEMAKER OR DEFIBRILLATOR?    NO .

           

      RESPIRATORY:

           

          HAVE YOU BEEN SICK IN THE PAST WEEK?    NO . FEVER    NO . FLU

          LIKE SYMPTOMS?    NO . COUGH    NO .

           

      INTEGUMENTARY:

           

          DO YOU HAVE ANY RASHES OR OPEN SORES?    NO .

           

      ALLERGIC/IMMUNO:

           

          ARE YOU ALLERGIC TO IV DYE?    NO . ANY NEW ALLERGIES?    NO .

           

      PSYCHIATRIC:

           

          DO YOU HAVE THOUGHTS OF HURTING YOURSELF OR SOMEONE ELSE?    NO .

          ARE YOU ABUSED, NEGLECTED, OR IN AN UNSAFE ENVIRONMENT?    NO .

           

      ENDOCRINOLOGY:

           

          ARE YOU DIABETIC?    NO .

           

      OTHER:

           

          DO YOU NEED ANY PRESCRIPTIONS?    NO . IF YES, PLEASE LIST:   

          ____ . ANY NEW PROBLEMS WITH YOUR MEDICATIONS?    NO . WHEN DID

          YOU LAST EAT?    ____ . WHEN DID YOU LAST DRINK?    ____ . WHAT

          DID YOU LAST DRINK?    ____ . NAME OF PERSON DRIVING YOU HOME?   

          ____ . DO YOU HAVE ANY OTHER QUESTIONS OR CONCERNS    NO .

           

VITAL SIGNS

           

           LBS, HT 69.5 IN, BMI 28.67 INDEX, /84 MM HG, HR 92

          /MIN, RR 16 /MIN, TEMP 97.0 F, OXYGEN SAT % 99%, SAFE IN ENV?

          (Y/N) YES, NA INITIALS SC 14:23, REVIEWED BY: JOSE ALFREDO.

           

EXAMINATION

           

      GENERAL EXAMINATION:

          GENERALNO ACUTE DISTRESS, WELL NOURISHED AND HYDRATED.

           

          PSYCHAPPROPRIATE MOOD AND AFFECT .

           

          LUNGS:CLEAR TO AUSCULTATION BILATERALLY, NO WHEEZES, RHONCHI,

          RALES.

           

          HEART:NO MURMURS, REGULAR RATE AND RHYTHM.

           

ASSESSMENTS

           

          BILATERAL SACROILIITIS - M46.1 (PRIMARY)

           

TREATMENT

           

      BILATERAL SACROILIITIS

          CLINICAL NOTES: 49-YEAR-OLD MALE IN FOR POST SIJ FOLLOW-UP. GIVEN

          PRESENTING SYMPTOMS AND RESULTS OF PHYSICAL EXAMINATION

          RECOMMENDED FOLLOW-UP IN 2 MONTHS. PATIENT HAS EXPRESSED

          UNDERSTANDING OF AND WAS IN AGREEMENT WITH TREATMENT PLAN. GIVEN

          TIME TO ASK QUESTIONS AND EXPRESS CONCERNS.

           

PROCEDURE CODES

           

           ESTABILISHED PATIENT [facility] FACILITY CHARGE

           

DISPOSITION & COMMUNICATION

           

FOLLOW UP

           

          2 MONTHS (REASON: SACROILIITIS)

           

 

ELECTRONICALLY SIGNED BY KORI BOLES ON

          2019 AT 12:59 PM EST

           

           

           

 

DISCLAIMER :

THIS IS A VISIT SUMMARY EXTRACTED FROM THE [employer] [facility] CHART.

IT IS NOT A COPY OF THE [employer] [facility] PROGRESS NOTE.

VIOLETTE

## 2020-01-21 ENCOUNTER — HOSPITAL ENCOUNTER (OUTPATIENT)
Dept: HOSPITAL 53 - M PAIN | Age: 50
End: 2020-01-21
Attending: NURSE PRACTITIONER
Payer: COMMERCIAL

## 2020-01-21 DIAGNOSIS — Z86.59: ICD-10-CM

## 2020-01-21 DIAGNOSIS — G89.29: ICD-10-CM

## 2020-01-21 DIAGNOSIS — G43.909: ICD-10-CM

## 2020-01-21 DIAGNOSIS — Z79.899: ICD-10-CM

## 2020-01-21 DIAGNOSIS — I10: ICD-10-CM

## 2020-01-21 DIAGNOSIS — M46.1: Primary | ICD-10-CM

## 2020-01-21 DIAGNOSIS — F17.220: ICD-10-CM

## 2020-01-21 DIAGNOSIS — Z88.8: ICD-10-CM

## 2020-01-21 DIAGNOSIS — Z88.0: ICD-10-CM

## 2020-01-21 DIAGNOSIS — E78.5: ICD-10-CM

## 2020-01-21 DIAGNOSIS — G47.00: ICD-10-CM

## 2020-01-21 DIAGNOSIS — G25.0: ICD-10-CM

## 2020-01-23 NOTE — ECWPNPC
PATIENT NAME: AYLEEN HERNANDEZ

: 1970

GENDER: MALE

MRN: [id number]

VISIT DATE: 2020

DISCHARGE DATE: 20 1505

VISIT LOCKED DATE TIME: 

PHYSICIAN: DEBBY ULLOA 

RESOURCE: DEBBY ULLOA 

 

           

           

REASON FOR APPOINTMENT

           

          1. UMR-SACROILIITIS

           

HISTORY OF PRESENT ILLNESS

           

      HISTORY OF PRESENT ILLNESS:

      PAIN

           

           

          THE PATIENT DESCRIBES THE PAIN...

           

           49-YEAR-OLD MALE IN FOR CHRONIC PAIN FOLLOW-UP. HE RATES HIS

          PAIN CURRENTLY AT A 6 OUT OF 10 AND DESCRIBES IT AS ACHING,

          STABBING, AND TENDER. PATIENT HAD A BILATERAL SIJ'S IN THE PAST

          WITH GOOD RESULTS HOWEVER ON  BREAK HE TOOK A 12 HOUR

          DRIVE AND THIS EXACERBATED HIS PAIN ON THE LEFT SIDE.

           

      FALL RISK SCREENING:

      SCREENING

           

           

          :NO FALLS REPORTED IN THE LAST YEAR

           

CURRENT MEDICATIONS

           

          TAKING LISINOPRIL 40 MG TABLET 1 TABLET ORALLY ONCE A DAY

          TAKING SIMVASTATIN 10 MG TABLET 1 TABLET IN THE EVENING ORALLY

          ONCE A DAY

          TAKING ZOMIG 5 MG TABLET 1 TABLET AS NEEDED ONE TIME ORALLY ONCE

          A DAY

          TAKING CYCLOBENZAPRINE HCL 10 MG TABLET 1 TABLET ORALLY THREE

          TIMES A DAY AS NEEDED FOR BACK PAIN

          TAKING ZOLPIDEM TARTRATE 10 MG TABLET 1 TABLET ORALLY ONCE A DAY

          AT BEDTIME

          TAKING NORCO 5-325 MG TABLET 1 TABLET ORALLY THREE TIMES A DAY AS

          NEEDED, MDD=3

          TAKING LIDOCAINE 5 % OINTMENT APPLY TO LOW BACK EXTERNALLY THREE

          TIMES A DAY

          MEDICATION LIST REVIEWED AND RECONCILED WITH THE PATIENT

           

PAST MEDICAL HISTORY

           

          RECURRENT LOW BACK PAIN (MRI NEGATIVE FOR DISC DISEASE)

          DEPRESSION

          INSOMNIA

          MIGRAINES

          RIGHT SHOULDER PAIN

          HYPERLIPIDEMIA

          ESSENTIAL TREMORS

          PAIN RIGHT ELBOW

          ESSENTIAL HYPERTENSION

           

ALLERGIES

           

          PENICILLIN V-POTASSIUM: HIVES - ALLERGY

          LEVAQUIN: MUSCLE PAIN - SIDE EFFECTS

          PROZAC: MUSCLE AND JOINT PAIN - SIDE EFFECTS - ONSET DATE

          2019

           

SURGICAL HISTORY

           

          LAPAROSCOPIC APPENDECTOMY 

          CHOLECYSTECTOMY 

          BILATERAL CARPAL REPAIR WITH ULNAR NERVE REPOSITIONING 

          RIGHT SHOULDER DECOMPRESSION 2016

           

FAMILY HISTORY

           

          FATHER:  34 YRS, LIVER CANCER

          MOTHER: ALIVE, HYPERTENSION

          2DAUGHTER(S) - HEALTHY.

           

SOCIAL HISTORY

           

          GENERAL:

           

          TOBACCO USE

          ARE YOU A:NONSMOKER , NEVER SMOKER

          ADDITIONAL FINDINGS: TOBACCO USERCHEWS LOOSE LEAF TOBACCO

          COUNCELED ON THE IMPORTANCE OF NOT CHEWING TOBACCO

           

           

          HIV / HEP-C SCREENING

          HIV TEST OFFERED TO PATIENT:YES

          DATE OFFERED:2017

          TEST ACCEPTED:NO

          HEP-C TEST OFFERED TO PATIENT:YES

          DATE OFFERED:2017

          REASON:PATIENT DECLINED

          TEST ACCEPTED:NO

          REASON:PATIENT DECLINED

           

           

          OTHERS AT HOME: WIFE, CHILDREN.

           

           

          EDUCATION

          LEVEL OF EDUCATION:FINISHED [employer]

           

           

          DIET: LOW FAT, LOW CHOLESTEROL.

           

           

          LANGUAGE

          LANGUAGES SPOKEN:ENGLISH

           

           

          DOMESTIC VIOLENCE

          DO YOU FEEL SAFE IN YOUR ENVIRONMENT?YES

           

           

          BMI CARE GOAL FOLLOW-UP

          ABOVE NORMAL BMI FOLLOW-UPDIETARY MANAGEMENT EDUCATION, GUIDANCE,

          AND COUNSELING

           

           

          RECREATIONAL DRUG USE  DENIES.

           

           

          EXERCISE: NO REGULAR EXERCISE.

           

           

          LEARNING BARRIERS / SPECIAL NEEDS

          CHANGE FROM LAST VISIT?NO

          BARRIERS TO LEARNING?NO

          HEARING IMPAIRED?NO

          VISION IMPAIRED?YES

          COGNITIVELY IMPAIRED?NO

          :CORRECTIVE LENSES

          READINESS TO LEARN?YES

          LEARNING PREFERENCES?NO

          LEARNING CAPABILITIES PRESENT?YES

          EMOTIONAL BARRIERS?NO

          SPECIAL DEVICES?NO

           NEEDED?NO

           

           

          LUNG CANCER SCREENING

          SMOKING STATUS:NON SMOKER

           

           

          PAIN CLINIC PFS, CLERGY, PUBLIC HEALTH REFERRALS

          HAS THE PATIENT BEEN EDUCATED REGARDING HIS/HER PLAN OF CARE?YES

          HAS THE PATIENT BEEN EDUCATED REGARDING PAIN, THE RISK FOR PAIN,

          THE IMPORTANCE OF EFFECTIVE PAIN MANAGEMENT, AND THE PAIN

          ASSESSMENT PROCESS?YES

           

           

          LATEX QUESTIONNAIRE

          LATEX ALLERGY : HAVE YOU EVER DEVELOPED ANY TYPE OF REACTION

          AFTER HANDLING LATEX PRODUCTS SUCH AS RUBBER GLOVES, CONDOMS,

          DIAPHRAGMS, BALLOONS, SOCKS, OR UNDERWEAR?NO

          LATEX ALLERGY : HAVE YOU EVER DEVELOPED ANY TYPE OF REACTION

          DURING OR AFTER DENTAL APPOINTMENT, VAGINAL/RECTAL EXAMINATION,

          SURGICAL PROCEDURE, OR ANY OTHER EXPOSURE?NO

          DATE ASKED : 2019

          LATEX RISK : HAVE YOU EVER HAD ANY DIFFICULTY BREATHING OR HIVES

          AFTER EATING OR HANDLING ANY FRUITS, OR VEGETABLES; SUCH AS KIWI,

          BANANAS, STONE FRUITS, OR CHESTNUTSNO

          LATEX RISK : DO YOU HAVE A PREVIOUS PERSONAL HISTORY OF MORE THAN

          NINE SURGERIES, SPINA BIFIDA, OR REPEATED CATHERIZATIONS? NO

          LATEX RISK : ARE YOU FREQUENTLY EXPOSED TO LATEX PRODUCTS IN YOUR

          OCCUPATION?NO

           

           

          CAFFEINE

          CAFFEINE USE?YES

          HOW OFTEN AND HOW MUCH? 2-3 COFFEE/DAY

           

           

          ADVANCE DIRECTIVE

          ADVANCE DIRECTIVE DISCUSSED WITH PATIENT:YES PT DOES NOT HAVE ANY

          ADVANCE DIRECTIVES AND HE DECLINES INFORMATION ON HCP AT THIS

          TIME.

           

           

          Christianity

          [id number]

           

           

          MARITAL STATUS: , 2 CHILDREN.

           

           

          ALCOHOL SCREENING

          DID YOU HAVE A DRINK CONTAINING ALCOHOL IN THE PAST YEAR?YES

          HOW OFTEN DID YOU HAVE SIX OR MORE DRINKS ON ONE OCCASION IN THE

          PAST YEAR?NEVER (0 POINTS)

          HOW MANY DRINKS DID YOU HAVE ON A TYPICAL DAY WHEN YOU WERE

          DRINKING IN THE PAST YEAR?1 OR 2 (0 POINTS)

          HOW OFTEN DID YOU HAVE A DRINK CONTAINING ALCOHOL IN THE PAST

          YEAR?TWO TO THREE TIMES PER WEEK (3 POINTS)

          POINTS3

          INTERPRETATIONNEGATIVE

           

           

          OCCUPATION: .

           

           

          SEXUAL HX

          HAD SEX IN THE LAST 12 MONTHS (VAGINAL, ORAL, OR ANAL)?YES

          WITHWOMEN ONLY

           

           

          REVIEWED WITH PATIENT 19 1426 NLJREVIEWED WITH PATIENT

          10/4/19 1308 LASREVIEWED WITH PATIENT 19 LASREVIEWED WITH

          PATIENT 19 1445 JS.

           

HOSPITALIZATION/MAJOR DIAGNOSTIC PROCEDURE

           

          SURGERIES AS ABOVE

           

REVIEW OF SYSTEMS

           

      REVIEWED BY:

           

          PROVIDER:    JOHAN SHEIKH-C .

           

      CONSTITUTIONAL:

           

          ANY CHANGE IN YOUR MEDICAL CONDITION?    NO . CHILLS    NO .

          FEVER    NO .

           

      INFECTION:

           

          DO YOU HAVE NEW INFECTIONS?    NO . DO YOU HAVE HISTORY OF MRSA? 

            NO .

           

      MUSCULOSKELETAL:

           

          ANY NEW PATTERNS OF PAIN OR NUMBNESS?    YES, PAIN HAS RETURNED

          TO LEFT SIDE .

           

      GASTROENTEROLOGY:

           

          ANY NEW CHANGE IN BOWEL CONTROL?    NO .

           

      GENITOURINARY:

           

          ANY NEW CHANGE IN BLADDER CONTROL?    NO . IS THERE A CHANCE YOU

          COULD BE PREGNANT?    NO .

           

      HEMATOLOGY/LYMPH:

           

          DO YOU TAKE ANY BLOOD THINNERS? (FOR EXAMPLE- COUMADIN, PLAVIX,

          AGGRENOX, PLATEL, PRADAXA, OR XARELTO)    NO . WHEN WAS YOUR LAST

          DOSE?    DATE: TIME:  .

           

      NEUROLOGY:

           

          HAVE YOU FALLEN IN THE PAST 12 MONTHS?    NO . ANY NEW EXTREMITY

          NUMBNESS OR WEAKNESS?    NO .

           

      CARDIOLOGY:

           

          DO YOU HAVE A PACEMAKER OR DEFIBRILLATOR?    NO .

           

      RESPIRATORY:

           

          HAVE YOU BEEN SICK IN THE PAST WEEK?    NO . FEVER    NO . FLU

          LIKE SYMPTOMS?    NO . COUGH    NO .

           

      INTEGUMENTARY:

           

          DO YOU HAVE ANY RASHES OR OPEN SORES?    NO .

           

      ALLERGIC/IMMUNO:

           

          ARE YOU ALLERGIC TO IV DYE?    NO . ANY NEW ALLERGIES?    NO .

           

      PSYCHIATRIC:

           

          DO YOU HAVE THOUGHTS OF HURTING YOURSELF OR SOMEONE ELSE?    NO .

          ARE YOU ABUSED, NEGLECTED, OR IN AN UNSAFE ENVIRONMENT?    NO .

           

      ENDOCRINOLOGY:

           

          ARE YOU DIABETIC?    NO .

           

      OTHER:

           

          DO YOU NEED ANY PRESCRIPTIONS?    NO . IF YES, PLEASE LIST:   

          ____ . ANY NEW PROBLEMS WITH YOUR MEDICATIONS?    NO . WHEN DID

          YOU LAST EAT?    ____ . WHEN DID YOU LAST DRINK?    ____ . WHAT

          DID YOU LAST DRINK?    ____ . NAME OF PERSON DRIVING YOU HOME?   

          ____ . DO YOU HAVE ANY OTHER QUESTIONS OR CONCERNS    NO .

           

VITAL SIGNS

           

           LBS, HT 69.5 IN, BMI 30.13 INDEX, /85 MM HG, HR 76

          /MIN, RR 16 /MIN, TEMP 98.4 F, OXYGEN SAT % 98, REVIEWED BY: EM.

           

EXAMINATION

           

      GENERAL EXAMINATION:

          GENERALNO ACUTE DISTRESS, WELL NOURISHED AND HYDRATED.

           

          PSYCHAPPROPRIATE MOOD AND AFFECT .

           

          LUNGS:CLEAR TO AUSCULTATION BILATERALLY, NO WHEEZES, RHONCHI,

          RALES.

           

          HEART:NO MURMURS, REGULAR RATE AND RHYTHM.

           

          BACK:POINT TENDER OVER LEFT SIJ POSITIVE LELA'S TEST LEFT SIDE

          .

           

ASSESSMENTS

           

          BILATERAL SACROILIITIS - M46.1 (PRIMARY)

           

TREATMENT

           

      BILATERAL SACROILIITIS

          START GABAPENTIN CAPSULE, 100 MG, 1 CAPSULE, ORALLY, THREE TIMES

          DAILY, 30 DAY(S), 90

          NOTES: LEFT SIJ.

          CLINICAL NOTES: 49-YEAR-OLD MALE IN FOR CHRONIC PAIN FOLLOW-UP.

          GIVEN PRESENTING SYMPTOMS AND RESULTS OF PHYSICAL EXAMINATION

          RECOMMENDED LEFT SIJ WITH POST PROCEDURAL FOLLOW-UP. FURTHER

          RECOMMENDED GABAPENTIN 100 MG 3 TIMES A DAY. PATIENT HAS

          EXPRESSED UNDERSTANDING OF AND WAS IN AGREEMENT WITH TREATMENT

          PLAN. GIVEN TIME TO ASK QUESTIONS AND EXPRESS CONCERNS.

           

PROCEDURE CODES

           

           ESTABILISHED PATIENT [facility] CHARGE

           

DISPOSITION & COMMUNICATION

           

FOLLOW UP

           

          POSTPROCEDURE (REASON: LEFT SIJ)

           

 

ELECTRONICALLY SIGNED BY KORI BOLES ON

          2020 AT 10:41 AM EST

           

           

           

 

DISCLAIMER :

THIS IS A VISIT SUMMARY EXTRACTED FROM THE [employer] CHART.

IT IS NOT A COPY OF THE [employer] PROGRESS NOTE.

VIOLETTE

## 2020-03-12 ENCOUNTER — HOSPITAL ENCOUNTER (OUTPATIENT)
Dept: HOSPITAL 53 - M PAIN | Age: 50
End: 2020-03-12
Attending: ANESTHESIOLOGY
Payer: COMMERCIAL

## 2020-03-12 DIAGNOSIS — G43.909: ICD-10-CM

## 2020-03-12 DIAGNOSIS — Z88.8: ICD-10-CM

## 2020-03-12 DIAGNOSIS — F17.220: ICD-10-CM

## 2020-03-12 DIAGNOSIS — M46.1: Primary | ICD-10-CM

## 2020-03-12 DIAGNOSIS — G25.0: ICD-10-CM

## 2020-03-12 DIAGNOSIS — Z79.899: ICD-10-CM

## 2020-03-12 DIAGNOSIS — E78.5: ICD-10-CM

## 2020-03-12 DIAGNOSIS — Z88.0: ICD-10-CM

## 2020-03-12 DIAGNOSIS — I10: ICD-10-CM

## 2020-03-12 DIAGNOSIS — Z86.59: ICD-10-CM

## 2020-03-12 DIAGNOSIS — G47.00: ICD-10-CM

## 2020-03-12 NOTE — REP
Left SI joint:  Single view.

 

History:  SI joint injection for pain.

 

19 seconds of fluoroscopy time is reported.

 

Findings:  A single last image hold fluoroscopically obtained spot radiograph

documents needle position and contrast injection associated with left SI joint

injection procedure.

 

 

Electronically Signed by

Monty Joyce MD 03/12/2020 01:35 P

## 2020-03-24 NOTE — ECWPNPC
PATIENT NAME: AYLEEN HERNANDEZ

: 1970

GENDER: MALE

MRN: [id number]

VISIT DATE: 2020

DISCHARGE DATE: 20 1211

VISIT LOCKED DATE TIME: 

PHYSICIAN: GABBY DURAN MD

RESOURCE: GABBY DURAN MD

 

           

           

REASON FOR APPOINTMENT

           

          1. SIJ

           

HISTORY OF PRESENT ILLNESS

           

      HISTORY OF PRESENT ILLNESS:

      PAIN

           

           

          THE PATIENT DESCRIBES THE PAIN...

           

      FALL RISK SCREENING:

      SCREENING

           

           

          :NO FALLS REPORTED IN THE LAST YEAR

           

CURRENT MEDICATIONS

           

          TAKING LISINOPRIL 40 MG TABLET 1 TABLET ORALLY ONCE A DAY, NOTES:

          3/11/20

          TAKING ZOMIG 5 MG TABLET 1 TABLET AS NEEDED ONE TIME ORALLY ONCE

          A DAY, NOTES: 3/11/20

          TAKING CYCLOBENZAPRINE HCL 10 MG TABLET 1 TABLET ORALLY THREE

          TIMES A DAY AS NEEDED FOR BACK PAIN, NOTES: 3/11/20

          TAKING ZOLPIDEM TARTRATE 10 MG TABLET 1 TABLET ORALLY ONCE A DAY

          AT BEDTIME, NOTES: 3/11/20

          TAKING LIDOCAINE 5 % OINTMENT APPLY TO LOW BACK EXTERNALLY THREE

          TIMES A DAY, NOTES: 3/11/20

          TAKING GABAPENTIN 100 MG CAPSULE 1 CAPSULE ORALLY THREE TIMES

          DAILY, NOTES: 3/11/20

          TAKING NORCO 5-325 MG TABLET 1 TABLET ORALLY THREE TIMES A DAY AS

          NEEDED, MDD=3, NOTES: 3/11/20

          TAKING SIMVASTATIN 10 MG TABLET 1 TABLET IN THE EVENING ORALLY

          ONCE A DAY, NOTES: 3/11/20

          MEDICATION LIST REVIEWED AND RECONCILED WITH THE PATIENT

           

PAST MEDICAL HISTORY

           

          RECURRENT LOW BACK PAIN (MRI NEGATIVE FOR DISC DISEASE)

          DEPRESSION

          INSOMNIA

          MIGRAINES

          RIGHT SHOULDER PAIN

          HYPERLIPIDEMIA

          ESSENTIAL TREMORS

          PAIN RIGHT ELBOW

          ESSENTIAL HYPERTENSION

           

ALLERGIES

           

          PENICILLIN V-POTASSIUM: HIVES - ALLERGY

          LEVAQUIN: MUSCLE PAIN - SIDE EFFECTS

          PROZAC: MUSCLE AND JOINT PAIN - SIDE EFFECTS - ONSET DATE

          2019

           

SURGICAL HISTORY

           

          LAPAROSCOPIC APPENDECTOMY 

          CHOLECYSTECTOMY 

          BILATERAL CARPAL REPAIR WITH ULNAR NERVE REPOSITIONING 

          RIGHT SHOULDER DECOMPRESSION 2016

           

FAMILY HISTORY

           

          FATHER:  34 YRS, LIVER CANCER

          MOTHER: ALIVE, HYPERTENSION

          2DAUGHTER(S) - HEALTHY.

           

SOCIAL HISTORY

           

          GENERAL:

           

          TOBACCO USE

          ARE YOU A:NONSMOKER , NEVER SMOKER

          ADDITIONAL FINDINGS: TOBACCO USERCHEWS LOOSE LEAF TOBACCO

          COUNCELED ON THE IMPORTANCE OF NOT CHEWING TOBACCO

           

           

          HIV / HEP-C SCREENING

          HIV TEST OFFERED TO PATIENT:YES

          DATE OFFERED:2017

          TEST ACCEPTED:NO

          HEP-C TEST OFFERED TO PATIENT:YES

          DATE OFFERED:2017

          REASON:PATIENT DECLINED

          TEST ACCEPTED:NO

          REASON:PATIENT DECLINED

           

           

          OTHERS AT HOME: WIFE, CHILDREN.

           

           

          EDUCATION

          LEVEL OF EDUCATION:FINISHED [employer]

           

           

          DIET: LOW FAT, LOW CHOLESTEROL.

           

           

          LANGUAGE

          LANGUAGES SPOKEN:ENGLISH

           

           

          DOMESTIC VIOLENCE

          DO YOU FEEL SAFE IN YOUR ENVIRONMENT?YES

           

           

          BMI CARE GOAL FOLLOW-UP

          ABOVE NORMAL BMI FOLLOW-UPDIETARY MANAGEMENT EDUCATION, GUIDANCE,

          AND COUNSELING

           

           

          RECREATIONAL DRUG USE  DENIES.

           

           

          EXERCISE: NO REGULAR EXERCISE.

           

           

          LEARNING BARRIERS / SPECIAL NEEDS

          CHANGE FROM LAST VISIT?NO

          BARRIERS TO LEARNING?NO

          HEARING IMPAIRED?NO

          VISION IMPAIRED?YES

          COGNITIVELY IMPAIRED?NO

          :CORRECTIVE LENSES

          READINESS TO LEARN?YES

          LEARNING PREFERENCES?NO

          LEARNING CAPABILITIES PRESENT?YES

          EMOTIONAL BARRIERS?NO

          SPECIAL DEVICES?NO

           NEEDED?NO

           

           

          LUNG CANCER SCREENING

          SMOKING STATUS:NON SMOKER

           

           

          PAIN CLINIC PFS, CLERGY, PUBLIC HEALTH REFERRALS

          HAS THE PATIENT BEEN EDUCATED REGARDING HIS/HER PLAN OF CARE?YES

          HAS THE PATIENT BEEN EDUCATED REGARDING PAIN, THE RISK FOR PAIN,

          THE IMPORTANCE OF EFFECTIVE PAIN MANAGEMENT, AND THE PAIN

          ASSESSMENT PROCESS?YES

           

           

          LATEX QUESTIONNAIRE

          LATEX ALLERGY : HAVE YOU EVER DEVELOPED ANY TYPE OF REACTION

          AFTER HANDLING LATEX PRODUCTS SUCH AS RUBBER GLOVES, CONDOMS,

          DIAPHRAGMS, BALLOONS, SOCKS, OR UNDERWEAR?NO

          LATEX ALLERGY : HAVE YOU EVER DEVELOPED ANY TYPE OF REACTION

          DURING OR AFTER DENTAL APPOINTMENT, VAGINAL/RECTAL EXAMINATION,

          SURGICAL PROCEDURE, OR ANY OTHER EXPOSURE?NO

          DATE ASKED : 2019

          LATEX RISK : HAVE YOU EVER HAD ANY DIFFICULTY BREATHING OR HIVES

          AFTER EATING OR HANDLING ANY FRUITS, OR VEGETABLES; SUCH AS KIWI,

          BANANAS, STONE FRUITS, OR CHESTNUTSNO

          LATEX RISK : DO YOU HAVE A PREVIOUS PERSONAL HISTORY OF MORE THAN

          NINE SURGERIES, SPINA BIFIDA, OR REPEATED CATHERIZATIONS? NO

          LATEX RISK : ARE YOU FREQUENTLY EXPOSED TO LATEX PRODUCTS IN YOUR

          OCCUPATION?NO

           

           

          CAFFEINE

          CAFFEINE USE?YES

          HOW OFTEN AND HOW MUCH? 2-3 COFFEE/DAY

           

           

          ADVANCE DIRECTIVE

          ADVANCE DIRECTIVE DISCUSSED WITH PATIENT:YES PT DOES NOT HAVE ANY

          ADVANCE DIRECTIVES AND HE DECLINES INFORMATION ON HCP AT THIS

          TIME.

           

           

          [employer]vent

          [id number]

           

           

          MARITAL STATUS: , 2 CHILDREN.

           

           

          ALCOHOL SCREENING

          DID YOU HAVE A DRINK CONTAINING ALCOHOL IN THE PAST YEAR?YES

          HOW OFTEN DID YOU HAVE SIX OR MORE DRINKS ON ONE OCCASION IN THE

          PAST YEAR?NEVER (0 POINTS)

          HOW MANY DRINKS DID YOU HAVE ON A TYPICAL DAY WHEN YOU WERE

          DRINKING IN THE PAST YEAR?1 OR 2 (0 POINTS)

          HOW OFTEN DID YOU HAVE A DRINK CONTAINING ALCOHOL IN THE PAST

          YEAR?TWO TO THREE TIMES PER WEEK (3 POINTS)

          POINTS3

          INTERPRETATIONNEGATIVE

           

           

          OCCUPATION: .

           

           

          SEXUAL HX

          HAD SEX IN THE LAST 12 MONTHS (VAGINAL, ORAL, OR ANAL)?YES

          WITHWOMEN ONLY

           

           

          REVIEWED WITH PATIENT [id number] NLJREVIEWED WITH PATIENT

          10/4/19 1308 LASREVIEWED WITH PATIENT 19 LASREVIEWED WITH

          PATIENT 19 1445 JS.

           

HOSPITALIZATION/MAJOR DIAGNOSTIC PROCEDURE

           

          SURGERIES AS ABOVE

           

REVIEW OF SYSTEMS

           

      REVIEWED BY:

           

          PROVIDER:    _____ .

           

      CONSTITUTIONAL:

           

          ANY CHANGE IN YOUR MEDICAL CONDITION?    NO . CHILLS    NO .

          FEVER    NO .

           

      INFECTION:

           

          DO YOU HAVE NEW INFECTIONS?    NO . DO YOU HAVE HISTORY OF MRSA? 

            NO .

           

      MUSCULOSKELETAL:

           

          ANY NEW PATTERNS OF PAIN OR NUMBNESS?    NO .

           

      GASTROENTEROLOGY:

           

          ANY NEW CHANGE IN BOWEL CONTROL?    NO .

           

      GENITOURINARY:

           

          ANY NEW CHANGE IN BLADDER CONTROL?    NO . IS THERE A CHANCE YOU

          COULD BE PREGNANT?    NO .

           

      HEMATOLOGY/LYMPH:

           

          DO YOU TAKE ANY BLOOD THINNERS? (FOR EXAMPLE- COUMADIN, PLAVIX,

          AGGRENOX, PLATEL, PRADAXA, OR XARELTO)    NO . WHEN WAS YOUR LAST

          DOSE?    DATE: TIME:  .

           

      NEUROLOGY:

           

          HAVE YOU FALLEN IN THE PAST 12 MONTHS?    NO . ANY NEW EXTREMITY

          NUMBNESS OR WEAKNESS?    NO .

           

      CARDIOLOGY:

           

          DO YOU HAVE A PACEMAKER OR DEFIBRILLATOR?    NO .

           

      RESPIRATORY:

           

          HAVE YOU BEEN SICK IN THE PAST WEEK?    NO . FEVER    NO . FLU

          LIKE SYMPTOMS?    NO . COUGH    NO .

           

      INTEGUMENTARY:

           

          DO YOU HAVE ANY RASHES OR OPEN SORES?    NO .

           

      ALLERGIC/IMMUNO:

           

          ARE YOU ALLERGIC TO IV DYE?    NO . ANY NEW ALLERGIES?    NO .

           

      PSYCHIATRIC:

           

          DO YOU HAVE THOUGHTS OF HURTING YOURSELF OR SOMEONE ELSE?    NO .

          ARE YOU ABUSED, NEGLECTED, OR IN AN UNSAFE ENVIRONMENT?    NO .

           

      ENDOCRINOLOGY:

           

          ARE YOU DIABETIC?    NO .

           

      OTHER:

           

          DO YOU NEED ANY PRESCRIPTIONS?    NO . IF YES, PLEASE LIST:   

          ____ . ANY NEW PROBLEMS WITH YOUR MEDICATIONS?    NO . WHEN DID

          YOU LAST EAT?    3/11/20 1900 . WHEN DID YOU LAST DRINK?   

          3/11/20 2100 . WHAT DID YOU LAST DRINK?    WATER . NAME OF PERSON

          DRIVING YOU HOME?    [facility] . DO YOU HAVE ANY OTHER QUESTIONS OR

          CONCERNS    NO .

           

VITAL SIGNS

           

           LBS, HT 69.5 IN, BMI 29.69 INDEX, /76 MM HG, HR 72

          /MIN, RR 16 /MIN, TEMP 98.1 F, OXYGEN SAT % 99, SAFE IN ENV?

          (Y/N) Y, REVIEWED BY: LULY.

           

ASSESSMENTS

           

          SACROILIITIS, NOT ELSEWHERE CLASSIFIED - M46.1 (PRIMARY)

           

PROCEDURES

           

      PN SI

          PRE PROCEDURE DIAGNOSIS    SACROILIITIS, SACROILIAC JOINT

          DYSFUNCTION

          POST PROCEDURE DIAGNOSIS    SACROILIITIS, SACROILIAC JOINT

          DYSFUNCTION

          PROCEDURE    LEFT SACROILIAC JOINT BLOCK

          SURGEON    DR. GABBY DURAN

          ASSISTANT    NONE

          ANESTHESIA    LOCAL

          PRE PROCEDURE NOTE    PATIENT WITH HISTORY OF CHRONIC LOW BACK

          PAIN. I EVALUATED THE PATIENT AND REVIEWED THE CHART. I WENT OVER

          THE RISKS, ALTERNATIVES, AND BENEFITS ASSOCIATED WITH THIS

          PROCEDURE. THE PATIENT WOULD LIKE TO PROCEED AND GAVE CONSENT TO

          PERFORM THE PROCEDURE. THE PATIENT DENIES UNEXPLAINABLE WEIGHT

          LOSS, FEVER, CHILLS, OR NEW CHANGES IN URINARY OR BOWEL CONTROL

          DESCRIPTION OF PROCEDURE    THE PATIENT WAS BROUGHT TO THE

          PROCEDURE ROOM AND PLACED IN THE PRONE POSITION. THE LUMBOSACRAL

          AREA WAS CLEANED WITH CHLORAPREP SOLUTION AND DRAPED ASEPTICALLY.

          THE PROCEDURE WAS DONE UNDER STERILE CONDITIONS. I CHECKED

          LATERALITY AND THE LEVEL WHERE THE PROCEDURE WAS GOING TO BE

          PERFORMED WITH THE PATIENT AND THE SUPPORTING STAFF AT THE MOMENT

          OF THE TIME OUT IN THE PROCEDURE ROOM. UNDER FLUOROSCOPIC

          GUIDANCE, TARGET POINT WAS SELECTED AT THE LOWER BORDER OF THE

          LEFT SACROILIAC JOINT. TARGET POINT WAS SELECTED AFTER MEDIAL

          ROTATION AND TILT OF THE MAGNIFIER OF THE C-ARM. LIDOCAINE WAS

          USED TO NUMB THE SKIN AND SUBCUTANEOUS TISSUE BELOW IT. A SPINAL

          NEEDLE, 22-GAUGE, WAS ADVANCED UNDER FLUOROSCOPIC GUIDANCE AND

          FOLLOWING PATIENT FEEDBACK UNTIL THE TARGET AREA WAS TOUCHED. THE

          POSITION OF THE NEEDLE WAS VERIFIED WITH AP AND LATERAL VIEWS.

          AFTER PROPER POSITION OF THE NEEDLE WAS ACHIEVED, ISOVUE M DYE

          30%, 0.25 ML, WAS INJECTED SHOWING SPREAD OF THE DYE. THEN, A

          SOLUTION OF 40 MG OF KENALOG WAS INJECTED IN LEFT JOINT WITH 3 ML

          OF BUPIVACAINE 0.125%. THERE WAS NO EVIDENCE OF BLOOD,

          PARESTHESIA OR CEREBROSPINAL FLUID DURING THE PROCEDURE. THE

          PATIENT WAS SENT TO THE RECOVERY ROOM. THE PATIENT WAS MOVING THE

          EXTREMITIES AND DOING WELL. THERE WAS NO COMPLICATION DURING THE

          PROCEDURE. FLUOROSCOPY TIME WAS 19 SECONDS

          POST PROCEDURE NOTE    THE PATIENT WILL BE SEEN IN A FOLLOW UP IN

          THE NEXT FEW WEEKS. I AM LOOKING FOR LONG LASTING PAIN RELIEF FOR

          THE PATIENT WITH THIS INJECTION. DEPENDING ON THIS PROCEDURE'S

          RESULTS, I WILL CONSIDER PERFORMING A LUMBAR EPIDURAL STEROID

          INJECTION FOR THE PATIENT IN THE FUTURE. INSTRUCTIONS WERE GIVEN,

          QUESTIONS WERE ANSWERED, AND THE PATIENT EXPRESSED UNDERSTANDING

          AND AGREED WITH THE PLAN. I, SABINO PEREZ, DOCUMENTED THE ABOVE

          INFORMATION ACTING AS A SCRIBE FOR DR. DURAN. I HAVE REVIEWED

          THE ABOVE DOCUMENT, WRITTEN BY SABINO PEREZ SCRIBMISSAEL AND I VERIFY

          THAT IT IS ACCURATE.

           

DIAGNOSTIC IMAGING

           

          [facility] FLUORO GUIDANCE (PAIN)[id number]

           

PROCEDURE CODES

           

          14200 INJECT SACROILIAC JOINT, MODIFIERS: LT

           

          6045F RADXPS IN END [id number]

           

DISPOSITION & COMMUNICATION

           

FOLLOW UP

           

          3 WEEKS

           

 

ELECTRONICALLY SIGNED BY GABBY DURAN MD, MD ON

          2020 AT 01:12 PM EDT

           

           

           

 

DISCLAIMER :

THIS IS A VISIT SUMMARY EXTRACTED FROM THE [employer] CHART.

IT IS NOT A COPY OF THE [employer] PROGRESS NOTE.

VIOLETTE

## 2020-05-29 ENCOUNTER — HOSPITAL ENCOUNTER (OUTPATIENT)
Dept: HOSPITAL 53 - M PAIN | Age: 50
End: 2020-05-29
Attending: NURSE PRACTITIONER
Payer: COMMERCIAL

## 2020-05-29 DIAGNOSIS — M46.1: Primary | ICD-10-CM

## 2020-06-02 NOTE — ECWPNPC
PATIENT NAME: AYLEEN HERNANDEZ

: 1970

GENDER: MALE

MRN: [id number]

VISIT DATE: 2020

DISCHARGE DATE: 20

VISIT LOCKED DATE TIME: 

PHYSICIAN: DEBBY ULLOA 

RESOURCE: DEBBY ULLOA 

 

           

           

REASON FOR APPOINTMENT

           

          1. BACK [email or web address] PAT DONE

           

HISTORY OF PRESENT ILLNESS

           

      GENERAL:

           - PERMISSION REQUESTED AND RECEIVED FROM PATIENT TO PERFORM

          TELEHEALTH VISIT. 50-YEAR-OLD MALE IN FOR CHRONIC PAIN FOLLOW-UP.

          HE RATES HIS PAIN CURRENTLY AT A 4/10 AND DESCRIBES IT AS A

          STEADY ACHE. PATIENT DOES ADMIT TO RECENT EXACERBATIONS OF HIS

          BACK PAIN RELATED TO INCREASED ACTIVITY. HE CONTINUES TO WORK

          WITH A CHIROPRACTOR AND FEELS THIS IS BENEFICIAL.

           

      FALL RISK SCREENING:

      SCREENING

           

           

          :NO FALLS REPORTED IN THE LAST YEAR

           

      PAIN SCREENING:

      PATIENT HAS A COMPLAINT OF ACUTE OR CHRONIC PAIN

           

           

          :YES

          LOCATION OF PAIN:LOW BACK, LEFT HIP, LEG(S)

          INTENSITY OF PAIN (SCALE OF 1 TO 10):5

          WHAT DOES YOUR PAIN FEEL LIKE:ACHING, BURNING, CONTINOUS, SHARP,

          STABBING, TENDER, SHOOTING

          DURATION:ALL DAY

          PAIN IS INCREASED BY:ACTIVITIES

          PAIN IS DECREASED BY:SITTING, OTHERS HEAT, ICE

          PLAN/GOALS/TREATMENT/INTERVENTION/FOLLOW UP: CURRENTLY SEEING

          CHIROPRACTOR

           

      NURSING NOTE:

           -.

           

      PAIN CENTER INTAKE QUESTIONS:

      NOTES:

          PT HAS BEEN PRESCRIBED GABAPENTIN, NOT FEELING THE PAIN RELIEF

          FROM THE INCREASED DOSAGE.

           

      DO YOU HAVE A HISTORY OF MRSA?

           

           

          :NO

           

      DO YOU TAKE A BLOOD THINNERS?

           

           

          :NO

           

      DO YOU HAVE ANY BLEEDING DISORDERS?

           

           

          :NO

           

      ANY NEW NUMBNESS OR WEAKNESS IN YOUR LEGS OR ARMS?

           

           

          :NO

           

      ANY PACEMAKER,DEFIBRILLATOR, OR DORSAL COLUMN

          STIMULATOR?

           

           

          :NO

           

      DO YOU HAVE ANY RASHES OR OPEN SORES?

           

           

          :NO

           

      ARE YOU ALLERGIC TO IV DYE?

           

           

          :NO

           

      ARE YOU DIABETIC?

           

           

          :NO

           

      ANY NEW PROBLEMS WITH YOUR MEDICATIONS?

           

           

          :YES PT STATES THAT HE IS NOT GETTING RELIEF FROM GABAPENITN

           

      HAVE YOU RECEIVED A VACCINE IN THE PAST 30 DAYS?

           

           

          :NO

           

      DO YOU PLAN TO RECEIVE A VACCINE IN THE NEXT 21

          DAYS?

           

           

          :NO

           

      DO YOU NEED ANY PRESCRIPTION?

           

           

          :NO

           

      DO YOU TAKE ANY IMMUNOSUPPRESSIVE MEDICATIONS?

           

           

          :NO

           

CURRENT MEDICATIONS

           

          TAKING ZOMIG 5 MG TABLET 1 TABLET AS NEEDED ONE TIME ORALLY ONCE

          A DAY

          TAKING LIDOCAINE 5 % OINTMENT APPLY TO LOW BACK EXTERNALLY THREE

          TIMES A DAY

          TAKING SIMVASTATIN 10 MG TABLET 1 TABLET IN THE EVENING ORALLY

          ONCE A DAY

          TAKING LISINOPRIL 40 MG TABLET 1 TABLET ORALLY ONCE A DAY

          TAKING CYCLOBENZAPRINE HCL 10 MG TABLET 1 TABLET ORALLY THREE

          TIMES A DAY AS NEEDED FOR BACK PAIN

          TAKING GABAPENTIN 300 MG CAPSULE 1 CAPSULE ORALLY THREE TIMES

          DAILY

          TAKING NORCO 5-325 MG TABLET 1 TABLET ORALLY THREE TIMES A DAY AS

          NEEDED, MDD=3

          TAKING ZOLPIDEM TARTRATE 10 MG TABLET 1 TABLET ORALLY ONCE A DAY

          AT BEDTIME

          MEDICATION LIST REVIEWED AND RECONCILED WITH THE PATIENT

           

PAST MEDICAL HISTORY

           

          RECURRENT LOW BACK PAIN (MRI NEGATIVE FOR DISC DISEASE)

          DEPRESSION

          INSOMNIA

          MIGRAINES

          RIGHT SHOULDER PAIN

          HYPERLIPIDEMIA

          ESSENTIAL TREMORS

          PAIN RIGHT ELBOW

          ESSENTIAL HYPERTENSION

           

ALLERGIES

           

          PENICILLIN V-POTASSIUM: HIVES - ALLERGY

          LEVAQUIN: MUSCLE PAIN - SIDE EFFECTS

          PROZAC: MUSCLE AND JOINT PAIN - SIDE EFFECTS - ONSET DATE

          2019

           

SURGICAL HISTORY

           

          LAPAROSCOPIC APPENDECTOMY 

          CHOLECYSTECTOMY 

          BILATERAL CARPAL REPAIR WITH ULNAR NERVE REPOSITIONING 

          RIGHT SHOULDER DECOMPRESSION 

           

FAMILY HISTORY

           

          FATHER:  34 YRS, LIVER CANCER

          MOTHER: ALIVE, HYPERTENSION

          2DAUGHTER(S) - HEALTHY.

           

SOCIAL HISTORY

           

          GENERAL:

           

          TOBACCO USE

          ARE YOU A:NONSMOKER , NEVER SMOKER

          ADDITIONAL FINDINGS: TOBACCO USERCHEWS LOOSE LEAF TOBACCO

          COUNCELED ON THE IMPORTANCE OF NOT CHEWING TOBACCO

           

           

          LATEX QUESTIONNAIRE

          LATEX ALLERGY : HAVE YOU EVER DEVELOPED ANY TYPE OF REACTION

          AFTER HANDLING LATEX PRODUCTS SUCH AS RUBBER GLOVES, CONDOMS,

          DIAPHRAGMS, BALLOONS, SOCKS, OR UNDERWEAR?NO

          LATEX ALLERGY : HAVE YOU EVER DEVELOPED ANY TYPE OF REACTION

          DURING OR AFTER DENTAL APPOINTMENT, VAGINAL/RECTAL EXAMINATION,

          SURGICAL PROCEDURE, OR ANY OTHER EXPOSURE?NO

          LATEX RISK : HAVE YOU EVER HAD ANY DIFFICULTY BREATHING OR HIVES

          AFTER EATING OR HANDLING ANY FRUITS, OR VEGETABLES; SUCH AS KIWI,

          BANANAS, STONE FRUITS, OR CHESTNUTSNO

          LATEX RISK : DO YOU HAVE A PREVIOUS PERSONAL HISTORY OF MORE THAN

          NINE SURGERIES, SPINA BIFIDA, OR REPEATED CATHERIZATIONS? NO

          LATEX RISK : ARE YOU FREQUENTLY EXPOSED TO LATEX PRODUCTS IN YOUR

          OCCUPATION?NO

          DATE ASKED : 2020

           

           

          LUNG CANCER SCREENING

          SMOKING STATUS:NON SMOKER

           

           

          BMI CARE GOAL FOLLOW-UP

          ABOVE NORMAL BMI FOLLOW-UPDIETARY MANAGEMENT EDUCATION, GUIDANCE,

          AND COUNSELING

           

           

          ALCOHOL SCREENING

          DID YOU HAVE A DRINK CONTAINING ALCOHOL IN THE PAST YEAR?YES

          HOW OFTEN DID YOU HAVE SIX OR MORE DRINKS ON ONE OCCASION IN THE

          PAST YEAR?NEVER (0 POINTS)

          HOW MANY DRINKS DID YOU HAVE ON A TYPICAL DAY WHEN YOU WERE

          DRINKING IN THE PAST YEAR?1 OR 2 (0 POINTS)

          HOW OFTEN DID YOU HAVE A DRINK CONTAINING ALCOHOL IN THE PAST

          YEAR?TWO TO THREE TIMES PER WEEK (3 POINTS)

          POINTS3

          INTERPRETATIONNEGATIVE

           

           

          RECREATIONAL DRUG USE  DENIES.

           

           

          CAFFEINE

          CAFFEINE USE?YES

          HOW OFTEN AND HOW MUCH? 2-3 COFFEE/DAY

           

           

          SEXUAL HX

          HAD SEX IN THE LAST 12 MONTHS (VAGINAL, ORAL, OR ANAL)?YES

          WITHWOMEN ONLY

           

           

          HIV / HEP-C SCREENING

          HIV TEST OFFERED TO PATIENT:YES

          DATE OFFERED:2017

          TEST ACCEPTED:NO

          HEP-C TEST OFFERED TO PATIENT:YES

          DATE OFFERED:2017

          REASON:PATIENT DECLINED

          TEST ACCEPTED:NO

          REASON:PATIENT DECLINED

           

           

          Evangelical

          [id number]

           

           

          LANGUAGE

          LANGUAGES SPOKEN:ENGLISH

           

           

          EDUCATION

          LEVEL OF EDUCATION:[employer]ISHED [employer]

           

           

          LEARNING BARRIERS / SPECIAL NEEDS

          CHANGE FROM LAST VISIT?NO

          BARRIERS TO LEARNING?NO

          HEARING IMPAIRED?NO

          VISION IMPAIRED?YES

          COGNITIVELY IMPAIRED?NO

          :CORRECTIVE LENSES

          READINESS TO LEARN?YES

          LEARNING PREFERENCES?NO

          LEARNING CAPABILITIES PRESENT?YES

          EMOTIONAL BARRIERS?NO

          SPECIAL DEVICES?NO

           NEEDED?NO

           

           

          DOMESTIC VIOLENCE

          DO YOU FEEL SAFE IN YOUR ENVIRONMENT?YES

           

           

          OCCUPATION: .

           

           

          DIET: LOW FAT, LOW CHOLESTEROL.

           

           

          EXERCISE: NO REGULAR EXERCISE.

           

           

          MARITAL STATUS: , 2 CHILDREN.

           

           

          OTHERS AT HOME: WIFE, CHILDREN.

           

           

          PAIN CLINIC PFS, CLERGY, PUBLIC HEALTH REFERRALS

          PFS REFERRAL NEEDED?NO

          CLERGY REFERRAL NEEDED?NO

          PUBLIC HEALTH REFERRAL NEEDED?NO

          WAS THE PROVIDER NOTIFIED OF ANY PERTINENT INFO?YES

          HAS THE PATIENT BEEN EDUCATED REGARDING HIS/HER PLAN OF CARE?YES

          HAS THE PATIENT BEEN EDUCATED REGARDING PAIN, THE RISK FOR PAIN,

          THE IMPORTANCE OF EFFECTIVE PAIN MANAGEMENT, AND THE PAIN

          ASSESSMENT PROCESS?YES

           

           

          ADVANCE DIRECTIVE

          ADVANCE DIRECTIVE DISCUSSED WITH PATIENT:YES HCP WIFE EVANGELIST HERNANDEZ [phone]

           

HOSPITALIZATION/MAJOR DIAGNOSTIC PROCEDURE

           

          SURGERIES AS ABOVE

           

REVIEW OF SYSTEMS

           

      CONSTITUTIONAL:

           

          ANY RECENT FEVER OR ILLNESS    NO . CHILLS    NO .

           

      GASTROENTEROLOGY:

           

          BOWEL INCONTINENCE    NO . ANY NEW CHANGE IN BOWEL CONTROL?    NO

          . ABDOMINAL PAIN    NO . CONSTIPATION    NO .

           

      GENITOURINARY:

           

          ANY NEW CHANGE IN BLADDER CONTROL?    NO . IS THERE A CHANCE YOU

          COULD BE PREGNANT?    NO . URINARY INCONTINENCE    NO .

           

      CARDIOLOGY:

           

          CHEST PRESSURE    NO . CHEST PAIN    NO .

           

      RESPIRATORY:

           

          COUGH    NO . SHORTNESS OF BREATH    NO .

           

EXAMINATION

           

      GENERAL EXAMINATION:

          GENERALNO ACUTE DISTRESS, WELL NOURISHED AND HYDRATED.

           

          PSYCHAPPROPRIATE MOOD AND AFFECT , ORIENTED X 3.

           

ASSESSMENTS

           

          BILATERAL SACROILIITIS - M46.1 (PRIMARY)

           

TREATMENT

           

      BILATERAL SACROILIITIS

          INCREASE GABAPENTIN CAPSULE, 400 MG, 1 CAPSULE, ORALLY, THREE

          TIMES DAILY, 30 DAY(S), 90

          CLINICAL NOTES: 50-YEAR-OLD MALE IN FOR CHRONIC PAIN FOLLOW-UP.

          GIVEN PRESENTING SYMPTOMS RECOMMEND INCREASING GABAPENTIN 

          MG 3 TIMES A DAY WITH FOLLOW-UP IN CLINIC IN 2 MONTHS. DISCUSSED

          POTENTIAL LESI WITH PATIENT AND THIS WILL BE DISCUSSED IN GREATER

          DETAIL WITH PATIENT AT HIS NEXT VISIT. PATIENT HAS EXPRESSED

          UNDERSTANDING OF AND WAS IN AGREEMENT WITH TREATMENT PLAN. GIVEN

          TIME TO ASK QUESTIONS AND EXPRESS CONCERNS.

           

          TELEHEALTH VISIT CONDUCTED VIA ZOOM. TIME SPENT WITH PATIENT 13

          MINUTES.

           

           

      OTHERS

          NOTES: UNABLE TO OBTAIN VITAL SIGNS DUE TO VIRTUAL VISIT, PT

          GIVES CONSENT TO ACCESS RECORDS FOR VIRTUAL VISIT. DS.

           

DISPOSITION & COMMUNICATION

           

FOLLOW UP

           

          2 MONTHS (REASON: SACROILLITS, MED INCREASE)

           

 

ELECTRONICALLY SIGNED BY KORI BOLES ON

          2020 AT 08:21 AM EDT

           

           

           

 

DISCLAIMER :

THIS IS A VISIT SUMMARY EXTRACTED FROM THE [employer] CHART.

IT IS NOT A COPY OF THE [employer] PROGRESS NOTE.

VIOLETTE

## 2020-06-19 ENCOUNTER — HOSPITAL ENCOUNTER (OUTPATIENT)
Dept: HOSPITAL 53 - M SFHCADAM | Age: 50
End: 2020-06-19
Attending: PHYSICIAN ASSISTANT
Payer: COMMERCIAL
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
## 2020-06-19 DIAGNOSIS — M25.50: Primary | ICD-10-CM

## 2020-06-20 LAB
B BURGDOR IGG+IGM SER-ACNC: <0.91 ISR (ref 0–0.9)
B BURGDOR IGM SER IA-ACNC: <0.8 INDEX (ref 0–0.79)

## 2020-07-10 ENCOUNTER — HOSPITAL ENCOUNTER (OUTPATIENT)
Dept: HOSPITAL 53 - M SFHCLACO | Age: 50
End: 2020-07-10
Attending: PHYSICIAN ASSISTANT
Payer: COMMERCIAL

## 2020-07-10 DIAGNOSIS — R79.89: Primary | ICD-10-CM

## 2020-07-10 LAB
FT4I SERPL CALC-MCNC: 5.1 % (ref 1.4–3.8)
T3RU NFR SERPL: 37 % (ref 33–40)
T4 SERPL-MCNC: 13.8 UG/DL (ref 4.5–12)
THYROPEROXIDASE AB SERPL IA-ACNC: 43.6 U/ML (ref ?–60)
TSH SERPL DL<=0.005 MIU/L-ACNC: < 0.005 UIU/ML (ref 0.36–3.74)

## 2020-07-13 LAB
T4BG SERPL-MCNC: 19 UG/ML (ref 13–39)
TSI ACT/NOR SER: 0.85 IU/L (ref 0–0.55)

## 2020-09-01 ENCOUNTER — HOSPITAL ENCOUNTER (OUTPATIENT)
Dept: HOSPITAL 53 - M RAD | Age: 50
End: 2020-09-01
Attending: PHYSICIAN ASSISTANT
Payer: COMMERCIAL

## 2020-09-01 DIAGNOSIS — E05.90: Primary | ICD-10-CM

## 2020-09-01 PROCEDURE — 76536 US EXAM OF HEAD AND NECK: CPT

## 2020-09-01 PROCEDURE — 78014 THYROID IMAGING W/BLOOD FLOW: CPT

## 2020-09-10 ENCOUNTER — HOSPITAL ENCOUNTER (OUTPATIENT)
Dept: HOSPITAL 53 - M PLALAB | Age: 50
End: 2020-09-10
Attending: FAMILY MEDICINE
Payer: COMMERCIAL

## 2020-09-10 DIAGNOSIS — R73.01: ICD-10-CM

## 2020-09-10 DIAGNOSIS — E78.5: Primary | ICD-10-CM

## 2020-09-10 DIAGNOSIS — E05.90: ICD-10-CM

## 2020-09-10 LAB
ALBUMIN SERPL BCG-MCNC: 3.8 GM/DL (ref 3.2–5.2)
ALT SERPL W P-5'-P-CCNC: 43 U/L (ref 12–78)
BILIRUB SERPL-MCNC: 0.3 MG/DL (ref 0.2–1)
BUN SERPL-MCNC: 15 MG/DL (ref 7–18)
CALCIUM SERPL-MCNC: 9 MG/DL (ref 8.5–10.1)
CHLORIDE SERPL-SCNC: 108 MEQ/L (ref 98–107)
CHOLEST SERPL-MCNC: 208 MG/DL (ref ?–200)
CHOLEST/HDLC SERPL: 5.78 {RATIO} (ref ?–5)
CK SERPL-CCNC: 86 U/L (ref 39–308)
CO2 SERPL-SCNC: 25 MEQ/L (ref 21–32)
CREAT SERPL-MCNC: 0.76 MG/DL (ref 0.7–1.3)
EST. AVERAGE GLUCOSE BLD GHB EST-MCNC: 123 MG/DL (ref 60–110)
GFR SERPL CREATININE-BSD FRML MDRD: > 60 ML/MIN/{1.73_M2} (ref 56–?)
GLUCOSE SERPL-MCNC: 104 MG/DL (ref 70–100)
HDLC SERPL-MCNC: 36 MG/DL (ref 40–?)
LDLC SERPL CALC-MCNC: 127 MG/DL (ref ?–100)
NONHDLC SERPL-MCNC: 172 MG/DL
POTASSIUM SERPL-SCNC: 4.5 MEQ/L (ref 3.5–5.1)
PROT SERPL-MCNC: 6.9 GM/DL (ref 6.4–8.2)
SODIUM SERPL-SCNC: 139 MEQ/L (ref 136–145)
T3 SERPL-MCNC: 175.1 NG/DL (ref 60–181)
T4 FREE SERPL-MCNC: 1.46 NG/DL (ref 0.76–1.46)
THYROGLOB AB SERPL-ACNC: 32.8 U/ML (ref ?–60)
THYROPEROXIDASE AB SERPL IA-ACNC: 68 U/ML (ref ?–60)
TRIGL SERPL-MCNC: 227 MG/DL (ref ?–150)
TSH SERPL DL<=0.005 MIU/L-ACNC: < 0.005 UIU/ML (ref 0.36–3.74)

## 2020-09-12 LAB
INSULIN SERPL-ACNC: 20.7 UIU/ML (ref 2.6–24.9)
TSH RECEP AB SER-ACNC: <1.1 IU/L (ref 0–1.75)
TSI ACT/NOR SER: 0.88 IU/L (ref 0–0.55)

## 2020-09-22 NOTE — REP
NUCLEAR THYROID SCAN WITH UPTAKE



HISTORY: Hyperthyroidism. 



COMPARISON: Ultrasound 09/01/2020.



FINDINGS: 

Following the oral administration of 434 uCi iodine-123 as sodium iodine, 
thyroid uptake is measured. The two hour uptake is 3.77%. This is below the 
normal range of 6% to 12%. The 24 hour uptake is 25.54%, which is within normal 
range of 25% to 35%. 



Thyroid scan shows mild enlargement of both lobes of the thyroid as seen on the 
ultrasound. There is somewhat heterogeneous uptake and in general, there is a 
greater degree of uptake throughout the left lobe compared to the right. 
However, no focal hot or cold nodule is seen bilaterally. 



IMPRESSION: 

Essentially normal thyroid uptake values. Mild enlargement of the thyroid with 
no focal hot or cold nodule scintigraphically.

MTDD

## 2020-09-22 NOTE — REP
THYROID ULTRASOUND



CLINICAL: Hyperthyroidism.



TECHNIQUE: Real-time gray scale and color evaluation using linear high frequency
transducer. 



FINDINGS: 



The thyroid gland is diffusely heterogeneous with small scattered nonspecific 
cysts and hypoechoic nodules suggested. 



Isthmus measures 2.9 mm in width. Right lobe measures 6.0 x 2.1 x 1.5 cm with 
the most prominent cystic lesions measuring 3.7 x 2.1 x 3.4 mm and 3.8 x 2.1 x 
2.5 mm in the mid pole region as well as hypoechoic nodule measuring 4.2 x 2.0 x
4.0 mm in the mid pole region. Left lobe measures 5.7 x 1.3 x 1.4 cm and 
includes 4.2 x 2.3 x 3.7 mm cyst in the mid pole. 



IMPRESSION: 



Heterogeneous thyroid gland with subtle changes as described above, relatively 
nonspecific in appearance.

MTDD

## 2021-03-22 ENCOUNTER — HOSPITAL ENCOUNTER (OUTPATIENT)
Dept: HOSPITAL 53 - M SFHCADAM | Age: 51
End: 2021-03-22
Attending: PHYSICIAN ASSISTANT
Payer: COMMERCIAL

## 2021-03-22 DIAGNOSIS — E05.90: ICD-10-CM

## 2021-03-22 DIAGNOSIS — E78.2: ICD-10-CM

## 2021-03-22 DIAGNOSIS — I10: Primary | ICD-10-CM

## 2021-03-22 LAB
ALBUMIN SERPL BCG-MCNC: 4.3 GM/DL (ref 3.2–5.2)
ALT SERPL W P-5'-P-CCNC: 30 U/L (ref 12–78)
BILIRUB SERPL-MCNC: 0.6 MG/DL (ref 0.2–1)
BUN SERPL-MCNC: 18 MG/DL (ref 7–18)
CALCIUM SERPL-MCNC: 8.8 MG/DL (ref 8.5–10.1)
CHLORIDE SERPL-SCNC: 104 MEQ/L (ref 98–107)
CHOLEST SERPL-MCNC: 237 MG/DL (ref ?–200)
CHOLEST/HDLC SERPL: 5.64 {RATIO} (ref ?–5)
CO2 SERPL-SCNC: 24 MEQ/L (ref 21–32)
CREAT SERPL-MCNC: 0.9 MG/DL (ref 0.7–1.3)
GFR SERPL CREATININE-BSD FRML MDRD: > 60 ML/MIN/{1.73_M2} (ref 56–?)
GLUCOSE SERPL-MCNC: 121 MG/DL (ref 70–100)
HDLC SERPL-MCNC: 42 MG/DL (ref 40–?)
LDLC SERPL CALC-MCNC: 150 MG/DL (ref ?–100)
NONHDLC SERPL-MCNC: 195 MG/DL
POTASSIUM SERPL-SCNC: 4.4 MEQ/L (ref 3.5–5.1)
PROT SERPL-MCNC: 7.2 GM/DL (ref 6.4–8.2)
SODIUM SERPL-SCNC: 137 MEQ/L (ref 136–145)
TRIGL SERPL-MCNC: 227 MG/DL (ref ?–150)

## 2021-06-08 ENCOUNTER — HOSPITAL ENCOUNTER (OUTPATIENT)
Dept: HOSPITAL 53 - M RAD | Age: 51
End: 2021-06-08
Attending: ORTHOPAEDIC SURGERY
Payer: COMMERCIAL

## 2021-06-08 DIAGNOSIS — M51.26: Primary | ICD-10-CM

## 2021-06-08 DIAGNOSIS — M47.896: ICD-10-CM

## 2021-06-08 DIAGNOSIS — M51.27: ICD-10-CM

## 2021-06-08 NOTE — REPVR
PROCEDURE INFORMATION: 

Exam: MR Lumbar Spine Without Contrast 

Exam date and time: 6/8/2021 7:29 AM 

Age: 51 years old 

Clinical indication: Low back pain; Additional info: Lbp 



TECHNIQUE: 

Imaging protocol: Multiplanar magnetic resonance images of the lumbar spine 

without intravenous contrast. 



COMPARISON: 

MRI-LS SPINE W/O FOLL WITH CON 10/8/2019 11:47 AM 



FINDINGS: 

Vertebrae: The lumbar vertebral bodies are normal in height,signal intensity 

and alignment.No acute fracture or dislocation is seen. 

Spinal epidural space: There is no evidence of epidural masses or hemorrhage. 

Spinal cord: The conus medullaris is normal. The cauda equina nerve roots 

demonstrate no crowding or displacement. 

L1-L2: There is no significant degenerative disc herniation.The spinal canal 

and neural foramina are patent and without significant stenosis. 

L2-L3: There is no significant degenerative disc herniation.The spinal canal 

and neural foramina are patent and without significant stenosis. 

L3-L4: There is no significant degenerative disc herniation.The spinal canal 

and neural foramina are patent and without significant stenosis. 

L4-L5: There is a mild diffuse posterior bulge causing mild effacement of the 

thecal sac.The facet joints demonstrate mild degenerative hypertrophy and 

sclerosis.The spinal canal and neural foramina are patent and without 

significant stenosis.  

L5-S1: There is a mild diffuse posterior bulge causing mild effacement of the 

thecal sac.The facet joints demonstrate moderate degenerative narrowing and 

sclerosis.

Stable small synovial cysts posterior to the left L5-S1 facet joint.There is no 

evidence of spinal canal narrowing.There is mild right foraminal stenosis.  

Soft tissues: The prevertebral soft tissues appear normal. 



IMPRESSION: 

MRI of the lumbar spine reveals stable degenerative spondylitic changes and 

degenerative disc disease as described above. 



Electronically signed by: David Washington On 06/08/2021  10:21:57 AM

## 2021-08-25 ENCOUNTER — HOSPITAL ENCOUNTER (OUTPATIENT)
Dept: HOSPITAL 53 - M SFHCADAM | Age: 51
End: 2021-08-25
Attending: FAMILY MEDICINE
Payer: COMMERCIAL

## 2021-08-25 ENCOUNTER — HOSPITAL ENCOUNTER (OUTPATIENT)
Dept: HOSPITAL 53 - M ADAMS | Age: 51
End: 2021-08-25
Attending: FAMILY MEDICINE
Payer: COMMERCIAL

## 2021-08-25 DIAGNOSIS — Z01.818: Primary | ICD-10-CM

## 2021-08-25 DIAGNOSIS — Z79.899: ICD-10-CM

## 2021-08-25 LAB
ALBUMIN SERPL BCG-MCNC: 4.1 GM/DL (ref 3.2–5.2)
ALT SERPL W P-5'-P-CCNC: 42 U/L (ref 12–78)
APTT BLD: 30.6 SECONDS (ref 25.9–37)
BASOPHILS # BLD AUTO: 0.1 10^3/UL (ref 0–0.2)
BASOPHILS NFR BLD AUTO: 1.2 % (ref 0–1)
BILIRUB SERPL-MCNC: 0.4 MG/DL (ref 0.2–1)
BUN SERPL-MCNC: 15 MG/DL (ref 7–18)
CALCIUM SERPL-MCNC: 9 MG/DL (ref 8.5–10.1)
CHLORIDE SERPL-SCNC: 109 MEQ/L (ref 98–107)
CO2 SERPL-SCNC: 22 MEQ/L (ref 21–32)
CREAT SERPL-MCNC: 1.1 MG/DL (ref 0.7–1.3)
EOSINOPHIL # BLD AUTO: 0.1 10^3/UL (ref 0–0.5)
EOSINOPHIL NFR BLD AUTO: 2.1 % (ref 0–3)
GFR SERPL CREATININE-BSD FRML MDRD: > 60 ML/MIN/{1.73_M2} (ref 56–?)
GLUCOSE SERPL-MCNC: 152 MG/DL (ref 70–100)
HCT VFR BLD AUTO: 41.5 % (ref 42–52)
HGB BLD-MCNC: 14 G/DL (ref 13.5–17.5)
INR PPP: 0.99
LYMPHOCYTES # BLD AUTO: 2.2 10^3/UL (ref 1.5–5)
LYMPHOCYTES NFR BLD AUTO: 32.9 % (ref 24–44)
MCH RBC QN AUTO: 31.6 PG (ref 27–33)
MCHC RBC AUTO-ENTMCNC: 33.7 G/DL (ref 32–36.5)
MCV RBC AUTO: 93.7 FL (ref 80–96)
MONOCYTES # BLD AUTO: 0.6 10^3/UL (ref 0–0.8)
MONOCYTES NFR BLD AUTO: 8.8 % (ref 2–8)
NEUTROPHILS # BLD AUTO: 3.7 10^3/UL (ref 1.5–8.5)
NEUTROPHILS NFR BLD AUTO: 54.3 % (ref 36–66)
PLATELET # BLD AUTO: 223 10^3/UL (ref 150–450)
POTASSIUM SERPL-SCNC: 4 MEQ/L (ref 3.5–5.1)
PROT SERPL-MCNC: 7.1 GM/DL (ref 6.4–8.2)
PROTHROMBIN TIME: 13.4 SECONDS (ref 12.7–14.5)
RBC # BLD AUTO: 4.43 10^6/UL (ref 4.3–6.1)
SODIUM SERPL-SCNC: 138 MEQ/L (ref 136–145)
WBC # BLD AUTO: 6.8 10^3/UL (ref 4–10)

## 2021-08-25 NOTE — REP
INDICATION:

PRE OP EXAM



COMPARISON:

None.



TECHNIQUE:

PA and lateral.



FINDINGS:

The mediastinum and cardiac silhouette are normal.  The lung fields are clear and

without acute consolidation, effusion, or pneumothorax.  The skeletal structures are

intact and normal.



IMPRESSION:

No acute cardiopulmonary process.





<Electronically signed by Ran Doherty > 08/25/21 [id number]

## 2022-09-21 ENCOUNTER — HOSPITAL ENCOUNTER (OUTPATIENT)
Dept: HOSPITAL 53 - M SFHCADAM | Age: 52
End: 2022-09-21
Attending: PHYSICIAN ASSISTANT
Payer: COMMERCIAL

## 2022-09-21 DIAGNOSIS — E05.90: ICD-10-CM

## 2022-09-21 DIAGNOSIS — E78.2: Primary | ICD-10-CM

## 2022-09-21 LAB
ALBUMIN SERPL BCG-MCNC: 4.2 GM/DL (ref 3.2–5.2)
ALT SERPL W P-5'-P-CCNC: 35 U/L (ref 12–78)
BILIRUB SERPL-MCNC: 0.4 MG/DL (ref 0.2–1)
BUN SERPL-MCNC: 15 MG/DL (ref 7–18)
CALCIUM SERPL-MCNC: 9.4 MG/DL (ref 8.5–10.1)
CHLORIDE SERPL-SCNC: 106 MEQ/L (ref 98–107)
CHOLEST SERPL-MCNC: 216 MG/DL (ref ?–200)
CHOLEST/HDLC SERPL: 5.27 {RATIO} (ref ?–5)
CO2 SERPL-SCNC: 28 MEQ/L (ref 21–32)
CREAT SERPL-MCNC: 1.09 MG/DL (ref 0.7–1.3)
GFR SERPL CREATININE-BSD FRML MDRD: > 60 ML/MIN/{1.73_M2} (ref 56–?)
GLUCOSE SERPL-MCNC: 115 MG/DL (ref 70–100)
HDLC SERPL-MCNC: 41 MG/DL (ref 40–?)
LDLC SERPL CALC-MCNC: 140 MG/DL (ref ?–100)
NONHDLC SERPL-MCNC: 175 MG/DL
POTASSIUM SERPL-SCNC: 4.8 MEQ/L (ref 3.5–5.1)
PROT SERPL-MCNC: 7.3 GM/DL (ref 6.4–8.2)
SODIUM SERPL-SCNC: 138 MEQ/L (ref 136–145)
TRIGL SERPL-MCNC: 177 MG/DL (ref ?–150)
TSH SERPL DL<=0.005 MIU/L-ACNC: 0.8 UIU/ML (ref 0.36–3.74)

## 2023-02-11 ENCOUNTER — HOSPITAL ENCOUNTER (EMERGENCY)
Dept: HOSPITAL 53 - M ED | Age: 53
Discharge: HOME | End: 2023-02-11
Payer: COMMERCIAL

## 2023-02-11 VITALS — HEIGHT: 72 IN | WEIGHT: 214.73 LBS | BODY MASS INDEX: 29.08 KG/M2

## 2023-02-11 VITALS — DIASTOLIC BLOOD PRESSURE: 93 MMHG | SYSTOLIC BLOOD PRESSURE: 137 MMHG
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
## 2023-02-11 DIAGNOSIS — E78.5: ICD-10-CM

## 2023-02-11 DIAGNOSIS — Z79.899: ICD-10-CM

## 2023-02-11 DIAGNOSIS — S50.311A: Primary | ICD-10-CM

## 2023-02-11 DIAGNOSIS — S50.01XA: ICD-10-CM

## 2023-02-11 DIAGNOSIS — S39.012A: ICD-10-CM

## 2023-02-11 DIAGNOSIS — S56.911A: ICD-10-CM

## 2023-02-11 DIAGNOSIS — I10: ICD-10-CM

## 2023-02-11 DIAGNOSIS — Z88.0: ICD-10-CM

## 2023-02-11 DIAGNOSIS — S30.0XXA: ICD-10-CM
